# Patient Record
Sex: MALE | Race: WHITE | NOT HISPANIC OR LATINO | Employment: UNEMPLOYED | URBAN - METROPOLITAN AREA
[De-identification: names, ages, dates, MRNs, and addresses within clinical notes are randomized per-mention and may not be internally consistent; named-entity substitution may affect disease eponyms.]

---

## 2023-02-14 LAB
ALT SERPL-CCNC: 32 U/L
AST SERPL-CCNC: 19 U/L (ref 10–40)
CHOLESTEROL (EXTERNAL): 183 MG/DL (ref 0–199)
HBA1C MFR BLD: 6.9 %
HDLC SERPL-MCNC: 36 MG/DL
LDL CHOLESTEROL CALCULATED (EXTERNAL): 95 MG/DL
NON HDL CHOLESTEROL (EXTERNAL): 147 MG/DL
TRIGLYCERIDES (EXTERNAL): 258 MG/DL

## 2024-01-04 LAB — INR (EXTERNAL): 1.1 (ref 0.9–1.2)

## 2024-01-06 LAB
CREATININE (EXTERNAL): 0.77 MG/DL (ref 0.73–1.18)
GFR ESTIMATED (EXTERNAL): >60 ML/MIN/1.73M2
GLUCOSE (EXTERNAL): 168 MG/DL (ref 74–106)
POTASSIUM (EXTERNAL): 4.4 MMOL/L (ref 3.4–5.1)
TSH SERPL-ACNC: 2.87 UIU/ML (ref 0.55–4.78)

## 2024-02-07 ENCOUNTER — TELEPHONE (OUTPATIENT)
Dept: FAMILY MEDICINE | Facility: OTHER | Age: 64
End: 2024-02-07
Payer: COMMERCIAL

## 2024-02-07 NOTE — TELEPHONE ENCOUNTER
Patient is scheduled for 9am appointment tomorrow for skin tag on eyelid removed. I do not offer this service given the sensitive location on the face. Please reach out to the patient as I can offer ENT or dermatology referral and save him an appointment. Let me know if he would be interested in this.    Juan Manuel Villanueva PA-C on 2/7/2024 at 3:15 PM

## 2024-02-08 DIAGNOSIS — L91.8 SKIN TAG: Primary | ICD-10-CM

## 2024-02-08 NOTE — PROGRESS NOTES
Referral placed for ophthalmology. [facility] will call you to coordinate your care as prescribed by your provider. If you don't hear from a representative within 2 business days, please call [phone].       Please let patient know, thanks,  Juan Manuel Villanueva PA-C on 2/8/2024 at 8:41 AM

## 2024-02-29 ENCOUNTER — TRANSFERRED RECORDS (OUTPATIENT)
Dept: MULTI SPECIALTY CLINIC | Facility: CLINIC | Age: 64
End: 2024-02-29
Payer: COMMERCIAL

## 2024-02-29 LAB — EJECTION FRACTION: 25 %

## 2024-03-14 ENCOUNTER — OFFICE VISIT (OUTPATIENT)
Dept: FAMILY MEDICINE | Facility: CLINIC | Age: 64
End: 2024-03-14
Payer: COMMERCIAL

## 2024-03-14 VITALS
WEIGHT: 230.56 LBS | TEMPERATURE: 97.2 F | SYSTOLIC BLOOD PRESSURE: 142 MMHG | RESPIRATION RATE: 18 BRPM | OXYGEN SATURATION: 98 % | HEART RATE: 78 BPM | BODY MASS INDEX: 32.28 KG/M2 | HEIGHT: 71 IN | DIASTOLIC BLOOD PRESSURE: 80 MMHG

## 2024-03-14 DIAGNOSIS — Z00.00 ROUTINE GENERAL MEDICAL EXAMINATION AT A HEALTH CARE FACILITY: Primary | ICD-10-CM

## 2024-03-14 DIAGNOSIS — I48.91 ATRIAL FIBRILLATION WITH RAPID VENTRICULAR RESPONSE (H): ICD-10-CM

## 2024-03-14 DIAGNOSIS — Z11.59 NEED FOR HEPATITIS C SCREENING TEST: ICD-10-CM

## 2024-03-14 DIAGNOSIS — Z12.11 SCREEN FOR COLON CANCER: ICD-10-CM

## 2024-03-14 DIAGNOSIS — I50.21 ACUTE SYSTOLIC HEART FAILURE (H): ICD-10-CM

## 2024-03-14 DIAGNOSIS — E11.9 TYPE 2 DIABETES MELLITUS WITHOUT COMPLICATION, WITHOUT LONG-TERM CURRENT USE OF INSULIN (H): ICD-10-CM

## 2024-03-14 DIAGNOSIS — Z11.4 SCREENING FOR HIV (HUMAN IMMUNODEFICIENCY VIRUS): ICD-10-CM

## 2024-03-14 PROBLEM — N40.0 BPH (BENIGN PROSTATIC HYPERPLASIA): Status: ACTIVE | Noted: 2024-01-05

## 2024-03-14 PROBLEM — I99.8 ACUTE LOWER LIMB ISCHEMIA: Status: ACTIVE | Noted: 2024-01-05

## 2024-03-14 PROBLEM — I10 ESSENTIAL HYPERTENSION: Status: ACTIVE | Noted: 2019-01-11

## 2024-03-14 LAB
ALBUMIN SERPL BCG-MCNC: 4.3 G/DL (ref 3.5–5.2)
ALP SERPL-CCNC: 78 U/L (ref 40–150)
ALT SERPL W P-5'-P-CCNC: 28 U/L (ref 0–70)
ANION GAP SERPL CALCULATED.3IONS-SCNC: 9 MMOL/L (ref 7–15)
AST SERPL W P-5'-P-CCNC: 19 U/L (ref 0–45)
BILIRUB SERPL-MCNC: 0.3 MG/DL
BUN SERPL-MCNC: 22.4 MG/DL (ref 8–23)
CALCIUM SERPL-MCNC: 9.4 MG/DL (ref 8.8–10.2)
CHLORIDE SERPL-SCNC: 105 MMOL/L (ref 98–107)
CHOLEST SERPL-MCNC: 195 MG/DL
CREAT SERPL-MCNC: 1 MG/DL (ref 0.67–1.17)
DEPRECATED HCO3 PLAS-SCNC: 24 MMOL/L (ref 22–29)
EGFRCR SERPLBLD CKD-EPI 2021: 85 ML/MIN/1.73M2
ERYTHROCYTE [DISTWIDTH] IN BLOOD BY AUTOMATED COUNT: 13.2 % (ref 10–15)
FASTING STATUS PATIENT QL REPORTED: ABNORMAL
GLUCOSE SERPL-MCNC: 186 MG/DL (ref 70–99)
HBA1C MFR BLD: 8.2 %
HCT VFR BLD AUTO: 47 % (ref 40–53)
HDLC SERPL-MCNC: 34 MG/DL
HGB BLD-MCNC: 15.2 G/DL (ref 13.3–17.7)
LDLC SERPL CALC-MCNC: ABNORMAL MG/DL
MCH RBC QN AUTO: 29.6 PG (ref 26.5–33)
MCHC RBC AUTO-ENTMCNC: 32.3 G/DL (ref 31.5–36.5)
MCV RBC AUTO: 92 FL (ref 78–100)
NONHDLC SERPL-MCNC: 161 MG/DL
PLATELET # BLD AUTO: 127 10E3/UL (ref 150–450)
POTASSIUM SERPL-SCNC: 4.5 MMOL/L (ref 3.4–5.3)
PROT SERPL-MCNC: 7.7 G/DL (ref 6.4–8.3)
RBC # BLD AUTO: 5.13 10E6/UL (ref 4.4–5.9)
SODIUM SERPL-SCNC: 138 MMOL/L (ref 135–145)
TRIGL SERPL-MCNC: 404 MG/DL
WBC # BLD AUTO: 7.9 10E3/UL (ref 4–11)

## 2024-03-14 PROCEDURE — 83036 HEMOGLOBIN GLYCOSYLATED A1C: CPT | Performed by: FAMILY MEDICINE

## 2024-03-14 PROCEDURE — 99214 OFFICE O/P EST MOD 30 MIN: CPT | Mod: 25 | Performed by: FAMILY MEDICINE

## 2024-03-14 PROCEDURE — 36415 COLL VENOUS BLD VENIPUNCTURE: CPT | Performed by: FAMILY MEDICINE

## 2024-03-14 PROCEDURE — 86706 HEP B SURFACE ANTIBODY: CPT | Performed by: FAMILY MEDICINE

## 2024-03-14 PROCEDURE — 80061 LIPID PANEL: CPT | Performed by: FAMILY MEDICINE

## 2024-03-14 PROCEDURE — 80053 COMPREHEN METABOLIC PANEL: CPT | Performed by: FAMILY MEDICINE

## 2024-03-14 PROCEDURE — 87389 HIV-1 AG W/HIV-1&-2 AB AG IA: CPT | Performed by: FAMILY MEDICINE

## 2024-03-14 PROCEDURE — 83721 ASSAY OF BLOOD LIPOPROTEIN: CPT | Performed by: FAMILY MEDICINE

## 2024-03-14 PROCEDURE — 85027 COMPLETE CBC AUTOMATED: CPT | Performed by: FAMILY MEDICINE

## 2024-03-14 PROCEDURE — 99386 PREV VISIT NEW AGE 40-64: CPT | Performed by: FAMILY MEDICINE

## 2024-03-14 RX ORDER — ATORVASTATIN CALCIUM 20 MG/1
20 TABLET, FILM COATED ORAL DAILY
COMMUNITY
Start: 2023-03-08

## 2024-03-14 RX ORDER — METFORMIN HCL 500 MG
1000 TABLET, EXTENDED RELEASE 24 HR ORAL 2 TIMES DAILY WITH MEALS
COMMUNITY
Start: 2023-03-08

## 2024-03-14 RX ORDER — TAMSULOSIN HYDROCHLORIDE 0.4 MG/1
0.8 CAPSULE ORAL
COMMUNITY
Start: 2023-03-08 | End: 2024-05-24

## 2024-03-14 RX ORDER — LISINOPRIL 5 MG/1
1 TABLET ORAL DAILY
COMMUNITY
Start: 2024-01-07

## 2024-03-14 RX ORDER — METOPROLOL SUCCINATE 50 MG/1
1 TABLET, EXTENDED RELEASE ORAL DAILY
COMMUNITY
Start: 2024-01-07 | End: 2024-04-15 | Stop reason: DRUGHIGH

## 2024-03-14 SDOH — HEALTH STABILITY: PHYSICAL HEALTH: ON AVERAGE, HOW MANY MINUTES DO YOU ENGAGE IN EXERCISE AT THIS LEVEL?: 20 MIN

## 2024-03-14 SDOH — HEALTH STABILITY: PHYSICAL HEALTH: ON AVERAGE, HOW MANY DAYS PER WEEK DO YOU ENGAGE IN MODERATE TO STRENUOUS EXERCISE (LIKE A BRISK WALK)?: 3 DAYS

## 2024-03-14 ASSESSMENT — SOCIAL DETERMINANTS OF HEALTH (SDOH): HOW OFTEN DO YOU GET TOGETHER WITH FRIENDS OR RELATIVES?: ONCE A WEEK

## 2024-03-14 ASSESSMENT — PAIN SCALES - GENERAL: PAINLEVEL: NO PAIN (0)

## 2024-03-14 NOTE — PATIENT INSTRUCTIONS
Preventive Care Advice   This is general advice given by our system to help you stay healthy. However, your care team may have specific advice just for you. Please talk to your care team about your preventive care needs.  Nutrition  Eat 5 or more servings of fruits and vegetables each day.  Try wheat bread, brown rice and whole grain pasta (instead of white bread, rice, and pasta).  Get enough calcium and vitamin D. Check the label on foods and aim for 100% of the RDA (recommended daily allowance).  Lifestyle  Exercise at least 150 minutes each week   (30 minutes a day, 5 days a week).  Do muscle strengthening activities 2 days a week. These help control your weight and prevent disease.  No smoking.  Wear sunscreen to prevent skin cancer.  Have a dental exam and cleaning every 6 months.  Yearly exams  See your health care team every year to talk about:  Any changes in your health.  Any medicines your care team has prescribed.  Preventive care, family planning, and ways to prevent chronic diseases.  Shots (vaccines)   HPV shots (up to age 26), if you've never had them before.  Hepatitis B shots (up to age 59), if you've never had them before.  COVID-19 shot: Get this shot when it's due.  Flu shot: Get a flu shot every year.  Tetanus shot: Get a tetanus shot every 10 years.  Pneumococcal, hepatitis A, and RSV shots: Ask your care team if you need these based on your risk.  Shingles shot (for age 50 and up).  General health tests  Diabetes screening:  Starting at age 35, Get screened for diabetes at least every 3 years.  If you are younger than age 35, ask your care team if you should be screened for diabetes.  Cholesterol test: At age 39, start having a cholesterol test every 5 years, or more often if advised.  Bone density scan (DEXA): At age 50, ask your care team if you should have this scan for osteoporosis (brittle bones).  Hepatitis C: Get tested at least once in your life.  STIs (sexually transmitted  infections)  Before age 24: Ask your care team if you should be screened for STIs.  After age 24: Get screened for STIs if you're at risk. You are at risk for STIs (including HIV) if:  You are sexually active with more than one person.  You don't use condoms every time.  You or a partner was diagnosed with a sexually transmitted infection.  If you are at risk for HIV, ask about PrEP medicine to prevent HIV.  Get tested for HIV at least once in your life, whether you are at risk for HIV or not.  Cancer screening tests  Cervical cancer screening: If you have a cervix, begin getting regular cervical cancer screening tests at age 21. Most people who have regular screenings with normal results can stop after age 65. Talk about this with your provider.  Breast cancer scan (mammogram): If you've ever had breasts, begin having regular mammograms starting at age 40. This is a scan to check for breast cancer.  Colon cancer screening: It is important to start screening for colon cancer at age 45.  Have a colonoscopy test every 10 years (or more often if you're at risk) Or, ask your provider about stool tests like a FIT test every year or Cologuard test every 3 years.  To learn more about your testing options, visit: https://[email or web address]/[email or web address].  For help making a decision, visit: https://bit.ly/ji35012.  Prostate cancer screening test: If you have a prostate and are age 55 to 69, ask your provider if you would benefit from a yearly prostate cancer screening test.  Lung cancer screening: If you are a current or former smoker age 50 to 80, ask your care team if ongoing lung cancer screenings are right for you.  For informational purposes only. Not to replace the advice of your health care provider. Copyright   2023 [employer]. All rights reserved. Clinically reviewed by the [facility] Transitions Program. [employer] [id number] - REV 01/24.    Learning About Stress  What is stress?     Stress is your  body's response to a hard situation. Your body can have a physical, emotional, or mental response. Stress is a fact of life for most people, and it affects everyone differently. What causes stress for you may not be stressful for someone else.  A lot of things can cause stress. You may feel stress when you go on a job interview, take a test, or run a race. This kind of short-term stress is normal and even useful. It can help you if you need to work hard or react quickly. For example, stress can help you finish an important job on time.  Long-term stress is caused by ongoing stressful situations or events. Examples of long-term stress include long-term health problems, ongoing problems at work, or conflicts in your family. Long-term stress can harm your health.  How does stress affect your health?  When you are stressed, your body responds as though you are in danger. It makes hormones that speed up your heart, make you breathe faster, and give you a burst of energy. This is called the fight-or-flight stress response. If the stress is over quickly, your body goes back to normal and no harm is done.  But if stress happens too often or lasts too long, it can have bad effects. Long-term stress can make you more likely to get sick, and it can make symptoms of some diseases worse. If you tense up when you are stressed, you may develop neck, shoulder, or low back pain. Stress is linked to high blood pressure and heart disease.  Stress also harms your emotional health. It can make you gomez, tense, or depressed. Your relationships may suffer, and you may not do well at work or school.  What can you do to manage stress?  You can try these things to help manage stress:   Do something active. Exercise or activity can help reduce stress. Walking is a great way to get started. Even everyday activities such as housecleaning or yard work can help.  Try yoga or kt chi. These techniques combine exercise and meditation. You may need  some training at first to learn them.  Do something you enjoy. For example, listen to music or go to a movie. Practice your hobby or do volunteer work.  Meditate. This can help you relax, because you are not worrying about what happened before or what may happen in the future.  Do guided imagery. Imagine yourself in any setting that helps you feel calm. You can use online videos, books, or a teacher to guide you.  Do breathing exercises. For example:  From a standing position, bend forward from the waist with your knees slightly bent. Let your arms dangle close to the floor.  Breathe in slowly and deeply as you return to a standing position. Roll up slowly and lift your head last.  Hold your breath for just a few seconds in the standing position.  Breathe out slowly and bend forward from the waist.  Let your feelings out. Talk, laugh, cry, and express anger when you need to. Talking with supportive friends or family, a counselor, or a areli leader about your feelings is a healthy way to relieve stress. Avoid discussing your feelings with people who make you feel worse.  Write. It may help to write about things that are bothering you. This helps you find out how much stress you feel and what is causing it. When you know this, you can find better ways to cope.  What can you do to prevent stress?  You might try some of these things to help prevent stress:  Manage your time. This helps you find time to do the things you want and need to do.  Get enough sleep. Your body recovers from the stresses of the day while you are sleeping.  Get support. Your family, friends, and community can make a difference in how you experience stress.  Limit your news feed. Avoid or limit time on social media or news that may make you feel stressed.  Do something active. Exercise or activity can help reduce stress. Walking is a great way to get started.  Where can you learn more?  Go to https://www.healthwise.net/patiented  Enter N032 in the  "search box to learn more about \"Learning About Stress.\"  Current as of: October 24, 2023               Content Version: 14.0    7506-5744 [employer].   Care instructions adapted under license by your healthcare professional. If you have questions about a medical condition or this instruction, always ask your healthcare professional. [employer] disclaims any warranty or liability for your use of this information.      Substance Use Disorder: Care Instructions  Overview     You can improve your life and health by stopping your use of alcohol or drugs. When you don't drink or use drugs, you may feel and sleep better. You may get along better with your family, friends, and coworkers. There are medicines and programs that can help with substance use disorder.  How can you care for yourself at home?  Here are some ways to help you stay sober and prevent relapse.  If you have been given medicine to help keep you sober or reduce your cravings, be sure to take it exactly as prescribed.  Talk to your doctor about programs that can help you stop using drugs or drinking alcohol.  Do not keep alcohol or drugs in your home.  Plan ahead. Think about what you'll say if other people ask you to drink or use drugs. Try not to spend time with people who drink or use drugs.  Use the time and money spent on drinking or drugs to do something that's important to you.  Preventing a relapse  Have a plan to deal with relapse. Learn to recognize changes in your thinking that lead you to drink or use drugs. Get help before you start to drink or use drugs again.  Try to stay away from situations, friends, or places that may lead you to drink or use drugs.  If you feel the need to drink alcohol or use drugs again, seek help right away. Call a trusted friend or family member. Some people get support from organizations such as Narcotics Anonymous or [employer] or from treatment facilities.  If you relapse, get help " as soon as you can. Some people make a plan with another person that outlines what they want that person to do for them if they relapse. The plan usually includes how to handle the relapse and who to notify in case of relapse.  Don't give up. Remember that a relapse doesn't mean that you have failed. Use the experience to learn the triggers that lead you to drink or use drugs. Then quit again. Recovery is a lifelong process. Many people have several relapses before they are able to quit for good.  Follow-up care is a key part of your treatment and safety. Be sure to make and go to all appointments, and call your doctor if you are having problems. It's also a good idea to know your test results and keep a list of the medicines you take.  When should you call for help?   Call 911  anytime you think you may need emergency care. For example, call if you or someone else:    Has overdosed or has withdrawal signs. Be sure to tell the emergency workers that you are or someone else is using or trying to quit using drugs. Overdose or withdrawal signs may include:  Losing consciousness.  Seizure.  Seeing or hearing things that aren't there (hallucinations).     Is thinking or talking about suicide or harming others.   Where to get help 24 hours a day, 7 days a week   If you or someone you know talks about suicide, self-harm, a mental health crisis, a substance use crisis, or any other kind of emotional distress, get help right away. You can:    Call the Suicide and Crisis Lifeline at 983.     Call 9-891-857-TALK (1-242.710.4136).     Text HOME to 492319 to access the Crisis Text Line.   Consider saving these numbers in your phone.  Go to [email or web address] for more information or to chat online.  Call your doctor now or seek immediate medical care if:    You are having withdrawal symptoms. These may include nausea or vomiting, sweating, shakiness, and anxiety.   Watch closely for changes in your health, and be sure to contact  "your doctor if:    You have a relapse.     You need more help or support to stop.   Where can you learn more?  Go to https://www.[employer].net/patiented  Enter H573 in the search box to learn more about \"Substance Use Disorder: Care Instructions.\"  Current as of: November 15, 2023               Content Version: 14.0    7478-8389 [employer].   Care instructions adapted under license by your healthcare professional. If you have questions about a medical condition or this instruction, always ask your healthcare professional. [employer] disclaims any warranty or liability for your use of this information.      "

## 2024-03-14 NOTE — LETTER
March 18, 2024      Carmita Horn  [id number] [facility] [address]      Dear ,    We are writing to inform you of your test results.    Your test results fall within the expected range(s) or remain unchanged from previous results.  We will review in detail at your follow up visit with me on 4/15/24. Please continue with your current treatment plan.   Resulted Orders   Hemoglobin A1c   Result Value Ref Range    Hemoglobin A1C 8.2 (H) <5.7 %      Comment:      Normal <5.7%   Prediabetes 5.7-6.4%    Diabetes 6.5% or higher     Note: Adopted from ADA consensus guidelines.   Comprehensive metabolic panel (BMP + Alb, Alk Phos, ALT, AST, Total. Bili, TP)   Result Value Ref Range    Sodium 138 135 - 145 mmol/L      Comment:      Reference intervals for this test were updated on 09/26/2023 to more accurately reflect our healthy population. There may be differences in the flagging of prior results with similar values performed with this method. Interpretation of those prior results can be made in the context of the updated reference intervals.     Potassium 4.5 3.4 - 5.3 mmol/L    Carbon Dioxide (CO2) 24 22 - 29 mmol/L    Anion Gap 9 7 - 15 mmol/L    Urea Nitrogen 22.4 8.0 - 23.0 mg/dL    Creatinine 1.00 0.67 - 1.17 mg/dL    GFR Estimate 85 >60 mL/min/1.73m2    Calcium 9.4 8.8 - 10.2 mg/dL    Chloride 105 98 - 107 mmol/L    Glucose 186 (H) 70 - 99 mg/dL    Alkaline Phosphatase 78 40 - 150 U/L      Comment:      Reference intervals for this test were updated on 11/14/2023 to more accurately reflect our healthy population. There may be differences in the flagging of prior results with similar values performed with this method. Interpretation of those prior results can be made in the context of the updated reference intervals.    AST 19 0 - 45 U/L      Comment:      Reference intervals for this test were updated on 6/12/2023 to more accurately reflect our healthy population. There may be differences in the  flagging of prior results with similar values performed with this method. Interpretation of those prior results can be made in the context of the updated reference intervals.    ALT 28 0 - 70 U/L      Comment:      Reference intervals for this test were updated on 6/12/2023 to more accurately reflect our healthy population. There may be differences in the flagging of prior results with similar values performed with this method. Interpretation of those prior results can be made in the context of the updated reference intervals.      Protein Total 7.7 6.4 - 8.3 g/dL    Albumin 4.3 3.5 - 5.2 g/dL    Bilirubin Total 0.3 <=1.2 mg/dL   CBC with platelets   Result Value Ref Range    WBC Count 7.9 4.0 - 11.0 10e3/uL    RBC Count 5.13 4.40 - 5.90 10e6/uL    Hemoglobin 15.2 13.3 - 17.7 g/dL    Hematocrit 47.0 40.0 - 53.0 %    MCV 92 78 - 100 fL    MCH 29.6 26.5 - 33.0 pg    MCHC 32.3 31.5 - 36.5 g/dL    RDW 13.2 10.0 - 15.0 %    Platelet Count 127 (L) 150 - 450 10e3/uL   Lipid panel reflex to direct LDL Fasting   Result Value Ref Range    Cholesterol 195 <200 mg/dL    Triglycerides 404 (H) <150 mg/dL    Direct Measure HDL 34 (L) >=40 mg/dL    LDL Cholesterol Calculated        Comment:      Cannot estimate LDL when triglyceride exceeds 400 mg/dL    Non HDL Cholesterol 161 (H) <130 mg/dL    Patient Fasting > 8hrs? Unknown       Comment:      LAST ATE 1PM OR NOON  LAST ATE 1PM OR NOON  LAST ATE 1PM OR NOON  LAST ATE 1PM OR NOON    Narrative    Cholesterol  Desirable:  <200 mg/dL    Triglycerides  Normal:  Less than 150 mg/dL  Borderline High:  150-199 mg/dL  High:  200-499 mg/dL  Very High:  Greater than or equal to 500 mg/dL    Direct Measure HDL  Female:  Greater than or equal to 50 mg/dL   Male:  Greater than or equal to 40 mg/dL    LDL Cholesterol  Desirable:  <100mg/dL  Above Desirable:  100-129 mg/dL   Borderline High:  130-159 mg/dL   High:  160-189 mg/dL   Very High:  >= 190 mg/dL    Non HDL Cholesterol  Desirable:  130  mg/dL  Above Desirable:  130-159 mg/dL  Borderline High:  160-189 mg/dL  High:  190-219 mg/dL  Very High:  Greater than or equal to 220 mg/dL   HIV Antigen Antibody Combo   Result Value Ref Range    HIV Antigen Antibody Combo Nonreactive Nonreactive      Comment:      Negative HIV-1/-2 antigen and antibody screening test results usually indicate the absence of HIV-1 and HIV-2 infection. However, such negative results do not rule-out acute HIV infection.  If acute HIV-1 or HIV-2 infection is suspected, detection of HIV-1 or HIV-2 RNA  is recommended.    Hepatitis B Surface Antibody   Result Value Ref Range    Hepatitis B Surface Antibody Nonreactive       Comment:      Nonreactive results, defined as anti-HBs levels of less than 8.5 mIU/mL, indicate a lack of recovery from acute or chronic hepatitis B or inadequate immune response to HBV vaccination.    Hepatitis B Surface Antibody Instrument Value <3.50 <8.5 m[IU]/mL   LDL cholesterol direct   Result Value Ref Range    LDL Cholesterol Direct 118 (H) <100 mg/dL      Comment:      Age 2-19 years:  Desirable: 0-110 mg/dL   Borderline high: 110-129 mg/dL   High: >= 130 mg/dL    Age 20 years and older:  Desirable: <100mg/dL  Above desirable: 100-129 mg/dL   Borderline high: 130-159 mg/dL   High: 160-189 mg/dL   Very high: >= 190 mg/dL   If you have any questions or concerns, please call the clinic at the number listed above.       Sincerely,      Colt Suarez MD

## 2024-03-14 NOTE — PROGRESS NOTES
Preventive Care Visit  [facility]  Colt Suarez MD, Family Medicine  Mar 14, 2024      Assessment & Plan   1. Routine general medical examination at a health care facility  [place] acute systolic heart failure with reduced ejection fraction due to new atrial fibrillation with rapid ventricular response.  Is a 63-year-old male who presents to clinic today as a new patient and in follow-up of hospitalization for new onset atrial fibrillation with rapid ventricular response and thromboembolic event to the left lower extremity leading to ischemia.  He has previously been receiving his health care through the [facility] down in [place].  Unfortunately he is insurance changed after the beginning of the year just before he developed acute onset of left ischemia leg symptoms.  He was seen at [facility] and admitted on January 4 and discharged on January 6.  We reviewed all of his past medical history and specifically his medications.  He has a history of hypertension and has been on amlodipine and lisinopril in the past.  He has a history of type 2 diabetes and has been on metformin in the past.  Per admission documentation he had been off of some of his medications prior to the onset of his leg ischemia.  The workup in the emergency department identified new onset atrial fibrillation with rapid ventricular response with associated acute systolic heart failure with an decreased ejection fraction of 30%.  He underwent thrombectomy for the acute limb ischemia and further cardiac evaluation including echocardiography.  He subsequently recently underwent a nuclear stress test and is followed by cardiology at [facility].  He has follow-up appointment with cardiology next week to review that nuclear stress test.    He has quite a bit of confusion about the medications that he is on.  Unfortunately he did not bring his pills in today.  Reviewing his past medical  history it appears that he is currently on lisinopril 5 mg once daily.  He states that this makes him feel dizzy however he has previously was on lisinopril 40 mg daily.  He is also on metoprolol 50 mg once daily for blood pressure and rate control.  He is currently on Eliquis 5 mg twice daily for the atrial fibrillation anticoagulation.  He is also currently on Lipitor 20 mg daily but he does not know whether he is taking this or not.  As a diabetic he should be taking this.  He is also on metformin 1000 mg daily and unfortunately his last hemoglobin A1c was over a year ago.  He also takes Flomax 0.8 mg at bedtime for benign prostatic hypertrophy.  His last PSA was a year ago as well.    We reviewed his past surgical history, the age and gender specific recommendations for screening including colonoscopy.  He has a strong family history for colon cancer and he himself has undergone previous colonoscopies with multiple polyps removed.  His last colonoscopy was in 2022 and he had multiple adenomatous polyps removed.  Recommendation was for a 1 year follow-up which she has not had.  We also reviewed vaccination opportunities specifically around COVID, influenza, tetanus, hepatitis B, and shingles.  He declines all vaccination at this time but is open to the idea of getting tetanus vaccine and possibly pneumonia vaccine.  We reviewed his past diabetic eye exams and he is past due for these.    In general he is quite confused about his current medications past medical history and the timing of screenings.  I think a lot of the confusion is around the change in his insurance coverage in the last year.    Today we clarified his medications and made recommendations for him to continue on his discharge medications as ordered.  Will also obtain a hemoglobin A1c and repeat his renal profile after his nuclear stress test.  He does have a follow-up appointment with cardiology next week to review his stress test.  I have asked  that he schedule follow-up with me in 4 to 6 weeks bringing in all of his medications to review what he has available and clarify the dosing's.  - HIV Antigen Antibody Combo; Future  - Hepatitis B Surface Antibody; Future    2. Screen for colon cancer  High risk with maternal history of colon cancer.  He himself is undergone multiple colonoscopies with multiple polyps removed at his last colonoscopy in 2022.  The recommendation was for annual follow-up.  He has not had his recommended follow-up last year.  Will schedule him with GI for colonoscopy.  He was previously having this done by [facility] in the [facility] but this can be done here in [place].  - Adult GI  Referral - Procedure Only; Future    3. Screening for HIV (human immunodeficiency virus)  Low risk and agrees to screening.    4. Need for hepatitis C screening test  Low risk and agrees to screening.    5. Atrial fibrillation with rapid ventricular response (H)  New onset atrial fibrillation with rapid ventricular response resulting in thromboembolic event to the lower extremity.  Echocardiogram demonstrated reduced ejection fraction at 30 to 35%.  He is currently anticoagulated with Eliquis 5 mg twice daily.  His rate is currently managed with metoprolol XL 50 mg daily.  He was started on lisinopril 5 mg for cardioprotection.  Continue these medications and follow-up with cardiology as scheduled next week.  He remains in atrial fibrillation today with a controlled rate of 80.  Blood pressure is slightly elevated because he has been holding his lisinopril because he thought this made him feel dizzy.  Recommend he continue with this and discussed with cardiology.  Chronic,  - apixaban ANTICOAGULANT (ELIQUIS) 5 MG tablet; Take 1 tablet (5 mg) by mouth 2 times daily  Dispense: 180 tablet; Refill: 1    6. Type 2 diabetes mellitus without complication, without long-term current use of insulin (H)  Chronic, uncontrolled.  He had not had an A1c in over a  "year.  His A1c today is 8.2.  He is currently on metformin 1000 mg daily.  Will increase his metformin to 1000 mg twice daily and because of his heart failure would consider adding Jardiance.  - Hemoglobin A1c; Future  - Comprehensive metabolic panel (BMP + Alb, Alk Phos, ALT, AST, Total. Bili, TP); Future  - CBC with platelets; Future  - Lipid panel reflex to direct LDL Fasting; Future    7. Acute systolic heart failure (H)  Acute systolic heart failure with reduced ejection fraction secondary to new onset atrial fibrillation with rapid ventricular response.  Patient is currently on lisinopril 5 mg once daily and metoprolol 50 mg daily.  He is anticoagulated with Eliquis 5 mg twice daily.  Echocardiogram demonstrated an ejection fraction of 30 to 35%.  He underwent nuclear stress test last week which showed no inducible ischemia but confirmed ejection fraction of 25%.  He is followed by cardiology and has follow-up appointment with cardiology next week to review.  Continue current medications and plan.  He will follow-up with me in 1 month to review the recommendations from cardiology and medications as well.    Patient has been advised of split billing requirements and indicates understanding: Yes  Review of prior external note(s) from - [facility] information from [facility]  and [facility] reviewed  Ordering of each unique test  Prescription drug management        Nicotine/Tobacco Cessation  He reports that he has been smoking cigarettes. He has been exposed to tobacco smoke. He has never used smokeless tobacco.  Nicotine/Tobacco Cessation Plan  Information offered: Patient not interested at this time      BMI  Estimated body mass index is 32.1 kg/m  as calculated from the following:    Height as of this encounter: 1.805 m (5' 11.06\").    Weight as of this encounter: 104.6 kg (230 lb 9 oz).   Weight management plan: Discussed healthy diet and exercise guidelines    Counseling  Appropriate preventive " services were discussed with this patient, including applicable screening as appropriate for fall prevention, nutrition, physical activity, Tobacco-use cessation, weight loss and cognition.  Checklist reviewing preventive services available has been given to the patient.  Reviewed patient's diet, addressing concerns and/or questions.   He is at risk for lack of exercise and has been provided with information to increase physical activity for the benefit of his well-being.   The patient was instructed to see the dentist every 6 months.   He is at risk for psychosocial distress and has been provided with information to reduce risk.       FUTURE APPOINTMENTS:       - Follow up with cardiology next week as scheduled  SELF MONITORING:       - Please check blood glucose readings daily       - Please check blood pressure readings daily       - Please monitor pulse  Work on weight loss  Regular exercise   Follow-up Visit   Expected date:  Apr 14, 2024 (Approximate)      Follow Up Appointment Details:     Follow-up with whom?: Me    Follow-Up for what?: Chronic Disease f/u    Chronic Disease f/u:  Diabetes  Heart Failure  Hyperlipidemia  Hypertension       How?: In Person             Follow-up Visit   Expected date:  Mar 14, 2025 (Approximate)      Follow Up Appointment Details:     Follow-up with whom?: PCP    Follow-Up for what?: Adult Preventive    How?: In Person                       Jelena Vizcarra is a 63 year old, presenting for the following:  Physical        3/14/2024     4:55 PM   Additional Questions   Roomed by Dania        Via the Health Maintenance questionnaire, the patient has reported the following services have been completed -Colonscopy, this information has been sent to the abstraction team.  Health Care Directive  Patient does not have a Health Care Directive or Living Will: Discussed advance care planning with patient; however, patient declined at this time.    HPI      Diabetes  "Follow-up    How often are you checking your blood sugar? Not at all  What concerns do you have today about your diabetes? None and Other: Questions about medication compliance.   Do you have any of these symptoms? (Select all that apply)  No numbness or tingling in feet.  No redness, sores or blisters on feet.  No complaints of excessive thirst.  No reports of blurry vision.  No significant changes to weight.          Hyperlipidemia Follow-Up    Are you regularly taking any medication or supplement to lower your cholesterol?   Yes- Lipitor  Are you having muscle aches or other side effects that you think could be caused by your cholesterol lowering medication?  No    Hypertension Follow-up    Do you check your blood pressure regularly outside of the clinic?  No  Are you following a low salt diet? No  Are your blood pressures ever more than 140 on the top number (systolic) OR more   than 90 on the bottom number (diastolic), for example 140/90? No    BP Readings from Last 2 Encounters:   03/14/24 (!) 142/80     No results found for: \"A1C\", \"LDL\"      Heart Failure Follow-up   Are you experiencing any shortness of breath? Yes, with activity only     How would you describe your shortness of breath?  Improved  Are you experiencing any swelling in your legs or feet?  No  Are you using more pillows than usual? No  Do you cough at night?  Yes  Do you check your weight daily?  No  Have you had a weight change recently?  No  Are you having any of the following side effects from your medications? (Select all that apply)  Dizziness  Since your last visit, how many times have you gone to the cardiologist, urgent care, emergency room, or hospital because of your heart failure?     Last Echo: No results found.        3/14/2024   General Health   How would you rate your overall physical health? (!) FAIR   Feel stress (tense, anxious, or unable to sleep) Only a little   (!) STRESS CONCERN      3/14/2024   Nutrition   Three or more " "servings of calcium each day? Yes   Diet: Diabetic   How many servings of fruit and vegetables per day? (!) 0-1   How many sweetened beverages each day? (!) 2         3/14/2024   Exercise   Days per week of moderate/strenous exercise 3 days   Average minutes spent exercising at this level 20 min         3/14/2024   Social Factors   Frequency of gathering with friends or relatives Once a week   Worry food won't last until get money to buy more No   Food not last or not have enough money for food? No   Do you have housing?  Yes   Are you worried about losing your housing? No   Lack of transportation? No   Unable to get utilities (heat,electricity)? No          No data to display                   3/14/2024   Dental   Dentist two times every year? (!) NO         3/14/2024   TB Screening   Were you born outside of [place]?  No         Today's PHQ-2 Score:       3/14/2024     5:00 PM   PHQ-2 ( 1999 [employer])   Q1: Little interest or pleasure in doing things 0   Q2: Feeling down, depressed or hopeless 0   PHQ-2 Score 0           3/14/2024   Substance Use   Alcohol more than 3/day or more than 7/wk No   Do you use any other substances recreationally? (!) ALCOHOL     Social History     Tobacco Use    Smoking status: Some Days     Types: Cigarettes     Passive exposure: Past    Smokeless tobacco: Never   Vaping Use    Vaping Use: Some days             3/14/2024   One time HIV Screening   Previous HIV test? No         3/14/2024   STI Screening   New sexual partner(s) since last STI/HIV test? No   Last PSA: No results found for: \"PSA\"  ASCVD Risk   The ASCVD Risk score (Emerald IRBY, et al., 2019) failed to calculate for the following reasons:    Cannot find a previous HDL lab    Cannot find a previous total cholesterol lab           Reviewed and updated as needed this visit by Provider                    Lab work is in process  Labs reviewed in EPIC  BP Readings from Last 3 Encounters:   03/14/24 (!) 142/80    Wt Readings from " "Last 3 Encounters:   03/14/24 104.6 kg (230 lb 9 oz)                  Patient Active Problem List   Diagnosis    Acute lower limb ischemia    Atrial fibrillation with rapid ventricular response (H)    BPH (benign prostatic hyperplasia)    Essential hypertension    Type 2 diabetes mellitus without complication, without long-term current use of insulin (H)    Pain in joint, shoulder region     History reviewed. No pertinent surgical history.    Social History     Tobacco Use    Smoking status: Some Days     Types: Cigarettes     Passive exposure: Past    Smokeless tobacco: Never   Substance Use Topics    Alcohol use: Not on file     History reviewed. No pertinent family history.      Current Outpatient Medications   Medication Sig Dispense Refill    apixaban ANTICOAGULANT (ELIQUIS) 5 MG tablet Take 1 tablet (5 mg) by mouth 2 times daily 180 tablet 1    lisinopril (ZESTRIL) 5 MG tablet Take 1 tablet by mouth daily      metFORMIN (GLUCOPHAGE XR) 500 MG 24 hr tablet Take 1,000 mg by mouth      metoprolol succinate ER (TOPROL XL) 50 MG 24 hr tablet Take 1 tablet by mouth daily      tamsulosin (FLOMAX) 0.4 MG capsule Take 0.8 mg by mouth       No Known Allergies  No lab results found.       Review of Systems  CONSTITUTIONAL: NEGATIVE for fever, chills, change in weight  ENT/MOUTH: NEGATIVE for ear, mouth and throat problems  RESP: NEGATIVE for significant cough or SOB  CV: POSITIVE for HX HTN, irregular heart beat, palpitations, and lightheadness and NEGATIVE for chest pain/chest pressure, lower extremity edema, and paroxysmal nocturnal dyspnea  ROS otherwise negative     Objective    Exam  BP (!) 142/80 (BP Location: Right arm, Patient Position: Sitting, Cuff Size: Adult Large)   Pulse 78   Temp 97.2  F (36.2  C) (Temporal)   Resp 18   Ht 1.805 m (5' 11.06\")   Wt 104.6 kg (230 lb 9 oz)   SpO2 98%   BMI 32.10 kg/m     Estimated body mass index is 32.1 kg/m  as calculated from the following:    Height as of this " "encounter: 1.805 m (5' 11.06\").    Weight as of this encounter: 104.6 kg (230 lb 9 oz).    Physical Exam  GENERAL: alert, no distress, and obese  EYES: Eyes grossly normal to inspection and eyelids- skin tag on left upper lid  HENT: ear canals and TM's normal, nose and mouth without ulcers or lesions  NECK: no adenopathy, no asymmetry, masses, or scars  RESP: lungs clear to auscultation - no rales, rhonchi or wheezes  CV: irregularly irregular rhythm, normal S1 S2, no S3 or S4, no murmur, click or rub, peripheral pulses strong, and no peripheral edema  ABDOMEN: soft, nontender, no hepatosplenomegaly, no masses and bowel sounds normal  MS: no gross musculoskeletal defects noted, no edema  NEURO: Normal strength and tone, mentation intact and speech normal  LYMPH: no cervical, supraclavicular, axillary, or inguinal adenopathy        Signed Electronically by: Colt Suarez MD    "

## 2024-03-15 LAB
HBV SURFACE AB SERPL IA-ACNC: <3.5 M[IU]/ML
HBV SURFACE AB SERPL IA-ACNC: NONREACTIVE M[IU]/ML
HIV 1+2 AB+HIV1 P24 AG SERPL QL IA: NONREACTIVE
LDLC SERPL DIRECT ASSAY-MCNC: 118 MG/DL

## 2024-03-19 ENCOUNTER — TELEPHONE (OUTPATIENT)
Dept: GASTROENTEROLOGY | Facility: CLINIC | Age: 64
End: 2024-03-19
Payer: COMMERCIAL

## 2024-03-19 ENCOUNTER — TELEPHONE (OUTPATIENT)
Dept: FAMILY MEDICINE | Facility: OTHER | Age: 64
End: 2024-03-19
Payer: COMMERCIAL

## 2024-03-19 NOTE — TELEPHONE ENCOUNTER
"Endoscopy Scheduling Screen    Have you had a positive Covid test in the last 14 days?  No    What is your communication preference for Instructions and/or Bowel Prep?   Mail/USPS    What insurance is in the chart?  Other:  [facility]     Ordering/Referring Provider:     ORA FRIAS       (If ordering provider performs procedure, schedule with ordering provider unless otherwise instructed. )    BMI: Estimated body mass index is 32.1 kg/m  as calculated from the following:    Height as of 3/14/24: 1.805 m (5' 11.06\").    Weight as of 3/14/24: 104.6 kg (230 lb 9 oz).     Sedation Ordered  moderate sedation.   If patient BMI > 50 do not schedule in [facility].    If patient BMI > 45 do not schedule at [facility].    Are you taking methadone or Suboxone?  No    Have you had difficulties, pain, or discomfort during past endoscopy procedures?  No    Are you taking any prescription medications for pain 3 or more times per week?   NO, No RN review required.    Do you have a history of malignant hyperthermia?  No    (Females) Are you currently pregnant?   No     Have you been diagnosed or told you have pulmonary hypertension?   No    Do you have an LVAD?  No    Have you been told you have moderate to severe sleep apnea?  No    Have you been told you have COPD, asthma, or any other lung disease?  No    Do you have any heart conditions?  No     Have you ever had or are you waiting for an organ transplant?  No. Continue scheduling, no site restrictions.    Have you had a stroke or transient ischemic attack (TIA aka \"mini stroke\" in the last 6 months?   No    Have you been diagnosed with or been told you have cirrhosis of the liver?   No    Are you currently on dialysis?   No    Do you need assistance transferring?   No    BMI: Estimated body mass index is 32.1 kg/m  as calculated from the following:    Height as of 3/14/24: 1.805 m (5' 11.06\").    Weight as of 3/14/24: 104.6 kg (230 lb 9 oz).     Is patients BMI > 40 and scheduling location " [facility]?  No    Do you take an injectable medication for weight loss or diabetes (excluding insulin)?  No    Do you take the medication Naltrexone?  No    Do you take blood thinners?  Yes  - eliquist     Are you taking Effient/Prasugrel?  No, you must contact your prescribing provider for direction on holding or bridging with a different medication.       Prep   Are you currently on dialysis or do you have chronic kidney disease?  No    Do you have a diagnosis of diabetes?  Yes (Golytely Prep)    Do you have a diagnosis of cystic fibrosis (CF)?  No    On a regular basis do you go 3 -5 days between bowel movements?  No    BMI > 40?  No    Preferred Pharmacy:    [facility] [address] [facility] [address] [place]  [address] [facility] [address]  Phone: [phone] Fax: [phone]      Final Scheduling Details     Procedure scheduled  Colonoscopy    Surgeon:  Shonna      Date of procedure:  04/01/2024     Pre-OP / PAC:   No - Not required for this site.    Location  [facility] - Per order.    Sedation   MAC/Deep Sedation  per loc       Patient Reminders:   You will receive a call from a Nurse to review instructions and health history.  This assessment must be completed prior to your procedure.  Failure to complete the Nurse assessment may result in the procedure being cancelled.      On the day of your procedure, please designate an adult(s) who can drive you home stay with you for the next 24 hours. The medicines used in the exam will make you sleepy. You will not be able to drive.      You cannot take public transportation, ride share services, or non-medical taxi service without a responsible caregiver.  Medical transport services are allowed with the requirement that a responsible caregiver will receive you at your destination.  We require that drivers and caregivers are confirmed prior to your procedure.

## 2024-03-19 NOTE — TELEPHONE ENCOUNTER
Patient is on Eliquis and has a colonoscopy on April 1st/    Needs instructions for holding medication/    Toya Ferrer XRO/

## 2024-03-19 NOTE — TELEPHONE ENCOUNTER
General Call      Reason for Call:  Patient stopped in and wants to know when he should stop his blood thinner medication prior to his surgery on April 1st.    What are your questions or concerns:  See above    Date of last appointment with provider: 03/14/2024    Okay to leave a detailed message?: Yes at Home number on file [phone]

## 2024-03-21 NOTE — TELEPHONE ENCOUNTER
Please have patient HOLD Eliquis 1 day prior to and the morning of the colonoscopy.  Colt Suarez MD

## 2024-03-21 NOTE — TELEPHONE ENCOUNTER
Spoke with patient and informed of note. Patient understood.     Closing encounter.   Alexa Rai MA

## 2024-03-29 NOTE — H&P
[facility] History and Physical    Carmita Horn MRN# [id number]   Age: 63 year old YOB: 1960     Date of Admission:  (Not on file)    Home clinic: [facility]  Primary care provider: Juan Manuel Villanueva          Impression and Plan:   Impression:   Screen for colon cancer [Z12.11]  Last colonoscopy 2022-results not viewable - reports poyps      Plan:   Proceed to EGD and Colonoscopy as planned.  The procedure, risks(bleeding, perforation), benefits and alternatives were discussed and the patient agrees to proceed. Cleared for Anesthesia             Chief Complaint:   Screen for colon cancer [Z12.11]    History is obtained from the patient          History of Present Illness:   This 63 year old male is being seen at this time for evaluation for colonoscopy.  22 polyps in 2022.  Mother with colon ca age 67.           Past Medical History:   No past medical history on file.         Past Surgical History:   No past surgical history on file.         Social History:     Social History     Tobacco Use    Smoking status: Some Days     Types: Cigarettes     Passive exposure: Past    Smokeless tobacco: Never   Substance Use Topics    Alcohol use: Not on file            Family History:   No family history on file.         Immunizations:     VACCINE/DOSE   Diptheria   DPT   DTAP   HBIG   Hepatitis A   Hepatitis B   HIB   Influenza   Measles   Meningococcal   MMR   Mumps   Pneumococcal   Polio   Rubella   Small Pox   TDAP   Varicella   Zoster            Allergies:     Allergies   Allergen Reactions    Penicillins Palpitations and Shortness Of Breath            Medications:     No current facility-administered medications for this encounter.     Current Outpatient Medications   Medication Sig    apixaban ANTICOAGULANT (ELIQUIS) 5 MG tablet Take 1 tablet (5 mg) by mouth 2 times daily    atorvastatin (LIPITOR) 20 MG tablet Take 20 mg by mouth daily    metFORMIN (GLUCOPHAGE XR) 500 MG  24 hr tablet Take 1,000 mg by mouth    metoprolol succinate ER (TOPROL XL) 50 MG 24 hr tablet Take 1 tablet by mouth daily    bisacodyl (DULCOLAX) 5 MG EC tablet Take 2 tablets at 3 pm the day before your procedure. If your procedure is before 11 am, take 2 additional tablets at 11 pm. If your procedure is after 11 am, take 2 additional tablets at 6 am. For additional instructions refer to your colonoscopy prep instructions.    lisinopril (ZESTRIL) 5 MG tablet Take 1 tablet by mouth daily    polyethylene glycol (GOLYTELY) 236 g suspension The night before the exam at 6 pm drink an 8-ounce glass every 15 minutes until the jug is half empty. If you arrive before 11 AM: Drink the other half of the Golytely jug at 11 PM night before procedure. If you arrive after 11 AM: Drink the other half of the Golytely jug at 6 AM day of procedure. For additional instructions refer to your colonoscopy prep instructions.    tamsulosin (FLOMAX) 0.4 MG capsule Take 0.8 mg by mouth             Review of Systems:   The review of systems was positive for the following findings.  None.  The remainder of the review of systems was unremarkable.          Physical Exam:   All vitals have been reviewed  There were no vitals taken for this visit.  No intake or output data in the 24 hours ending 03/29/24 1347  SHEENT examination revealed NC/aT, EOMI.  Examination of the chest revealed CTA.  Examination of the heart revealed RRR.  Examination of the abdomen revealed Soft, non tender.  The neuromuscular examination was NL.          Data:   All laboratory data reviewed  No results found for any visits on 04/01/24.  -     Juan José Kilpatrick MD, FACS

## 2024-04-01 ENCOUNTER — ANESTHESIA EVENT (OUTPATIENT)
Dept: GASTROENTEROLOGY | Facility: CLINIC | Age: 64
End: 2024-04-01
Payer: COMMERCIAL

## 2024-04-01 ENCOUNTER — HOSPITAL ENCOUNTER (OUTPATIENT)
Facility: CLINIC | Age: 64
Discharge: HOME | End: 2024-04-01
Attending: SPECIALIST | Admitting: SPECIALIST
Payer: COMMERCIAL

## 2024-04-01 ENCOUNTER — ANESTHESIA (OUTPATIENT)
Dept: GASTROENTEROLOGY | Facility: CLINIC | Age: 64
End: 2024-04-01
Payer: COMMERCIAL

## 2024-04-01 VITALS
DIASTOLIC BLOOD PRESSURE: 96 MMHG | WEIGHT: 230 LBS | HEIGHT: 71 IN | OXYGEN SATURATION: 97 % | SYSTOLIC BLOOD PRESSURE: 135 MMHG | TEMPERATURE: 97.7 F | BODY MASS INDEX: 32.2 KG/M2 | RESPIRATION RATE: 18 BRPM | HEART RATE: 101 BPM

## 2024-04-01 LAB
COLONOSCOPY: NORMAL
GLUCOSE BLDC GLUCOMTR-MCNC: 169 MG/DL (ref 70–99)

## 2024-04-01 PROCEDURE — 258N000003 HC RX IP 258 OP 636: Performed by: NURSE ANESTHETIST, CERTIFIED REGISTERED

## 2024-04-01 PROCEDURE — 250N000009 HC RX 250: Performed by: NURSE ANESTHETIST, CERTIFIED REGISTERED

## 2024-04-01 PROCEDURE — 45378 DIAGNOSTIC COLONOSCOPY: CPT | Performed by: SPECIALIST

## 2024-04-01 PROCEDURE — 370N000017 HC ANESTHESIA TECHNICAL FEE, PER MIN: Performed by: SPECIALIST

## 2024-04-01 PROCEDURE — G0121 COLON CA SCRN NOT HI RSK IND: HCPCS | Performed by: SPECIALIST

## 2024-04-01 PROCEDURE — G0105 COLORECTAL SCRN; HI RISK IND: HCPCS | Performed by: SPECIALIST

## 2024-04-01 PROCEDURE — 82962 GLUCOSE BLOOD TEST: CPT

## 2024-04-01 PROCEDURE — 250N000011 HC RX IP 250 OP 636: Performed by: NURSE ANESTHETIST, CERTIFIED REGISTERED

## 2024-04-01 RX ORDER — LIDOCAINE 40 MG/G
CREAM TOPICAL
Status: DISCONTINUED | OUTPATIENT
Start: 2024-04-01 | End: 2024-04-01 | Stop reason: HOSPADM

## 2024-04-01 RX ORDER — LIDOCAINE HYDROCHLORIDE 20 MG/ML
INJECTION, SOLUTION INFILTRATION; PERINEURAL PRN
Status: DISCONTINUED | OUTPATIENT
Start: 2024-04-01 | End: 2024-04-01

## 2024-04-01 RX ORDER — PROPOFOL 10 MG/ML
INJECTION, EMULSION INTRAVENOUS PRN
Status: DISCONTINUED | OUTPATIENT
Start: 2024-04-01 | End: 2024-04-01

## 2024-04-01 RX ORDER — PROPOFOL 10 MG/ML
INJECTION, EMULSION INTRAVENOUS CONTINUOUS PRN
Status: DISCONTINUED | OUTPATIENT
Start: 2024-04-01 | End: 2024-04-01

## 2024-04-01 RX ORDER — AMLODIPINE BESYLATE 5 MG/1
5 TABLET ORAL DAILY
COMMUNITY
Start: 2023-03-08

## 2024-04-01 RX ORDER — LIDOCAINE 40 MG/G
CREAM TOPICAL
Status: DISCONTINUED | OUTPATIENT
Start: 2024-04-01 | End: 2024-04-01

## 2024-04-01 RX ORDER — NALOXONE HYDROCHLORIDE 0.4 MG/ML
0.1 INJECTION, SOLUTION INTRAMUSCULAR; INTRAVENOUS; SUBCUTANEOUS
Status: DISCONTINUED | OUTPATIENT
Start: 2024-04-01 | End: 2024-04-01 | Stop reason: HOSPADM

## 2024-04-01 RX ORDER — ONDANSETRON 2 MG/ML
4 INJECTION INTRAMUSCULAR; INTRAVENOUS EVERY 30 MIN PRN
Status: DISCONTINUED | OUTPATIENT
Start: 2024-04-01 | End: 2024-04-01 | Stop reason: HOSPADM

## 2024-04-01 RX ORDER — SODIUM CHLORIDE, SODIUM LACTATE, POTASSIUM CHLORIDE, CALCIUM CHLORIDE 600; 310; 30; 20 MG/100ML; MG/100ML; MG/100ML; MG/100ML
INJECTION, SOLUTION INTRAVENOUS CONTINUOUS
Status: DISCONTINUED | OUTPATIENT
Start: 2024-04-01 | End: 2024-04-01 | Stop reason: HOSPADM

## 2024-04-01 RX ORDER — ONDANSETRON 4 MG/1
4 TABLET, ORALLY DISINTEGRATING ORAL EVERY 30 MIN PRN
Status: DISCONTINUED | OUTPATIENT
Start: 2024-04-01 | End: 2024-04-01 | Stop reason: HOSPADM

## 2024-04-01 RX ADMIN — SODIUM CHLORIDE, POTASSIUM CHLORIDE, SODIUM LACTATE AND CALCIUM CHLORIDE 10 ML/HR: 600; 310; 30; 20 INJECTION, SOLUTION INTRAVENOUS at 08:06

## 2024-04-01 RX ADMIN — LIDOCAINE HYDROCHLORIDE 25 MG: 20 INJECTION, SOLUTION INFILTRATION; PERINEURAL at 08:36

## 2024-04-01 RX ADMIN — PROPOFOL 80 MG: 10 INJECTION, EMULSION INTRAVENOUS at 08:36

## 2024-04-01 RX ADMIN — PROPOFOL 150 MCG/KG/MIN: 10 INJECTION, EMULSION INTRAVENOUS at 08:38

## 2024-04-01 ASSESSMENT — ACTIVITIES OF DAILY LIVING (ADL)
ADLS_ACUITY_SCORE: 35
ADLS_ACUITY_SCORE: 35

## 2024-04-01 ASSESSMENT — LIFESTYLE VARIABLES: TOBACCO_USE: 1

## 2024-04-01 ASSESSMENT — ENCOUNTER SYMPTOMS: DYSRHYTHMIAS: 1

## 2024-04-01 NOTE — DISCHARGE INSTRUCTIONS
[facility]    Home Care Following Endoscopy          Activity:  You have just undergone an endoscopic procedure usually performed with conscious sedation.  Do not work or operate machinery (including a car) for at least 12 hours.    I encourage you to walk and attempt to pass this air as soon as possible.    Diet:  Return to the diet you were on before your procedure but eat lightly for the first 12-24 hours.  Drink plenty of water.  Resume any regular medications unless otherwise advised by your physician.  Please begin any new medication prescribed as a result of your procedure as directed by your physician.   If you had any biopsy or polyp removed please refrain from aspirin or aspirin products for 2 days.  If on Coumadin please restart as instructed by your physician.   Pain:  You may take Tylenol as needed for pain.  Expected Recovery:  You can expect some mild abdominal fullness and/or discomfort due to the air used to inflate your intestinal tract. It is also normal to have a mild sore throat after upper endoscopy.    Call Your Physician if You Have:    After Colonoscopy:  Worsening persisting abdominal pain which is worse with activity.  Fevers (>101 degrees F), chills or shakes.  Passage of continued blood with bowel movements.     Any questions or concerns about your recovery, please call [phone] or after hours [phone] ([phone]) Nurse Advice Line.     rx printed

## 2024-04-01 NOTE — ANESTHESIA CARE TRANSFER NOTE
Patient: Carmita Horn    Procedure: Procedure(s):  Colonoscopy       Diagnosis: Screen for colon cancer [Z12.11]  Diagnosis Additional Information: No value filed.    Anesthesia Type:   MAC     Note:    Oropharynx: oropharynx clear of all foreign objects and spontaneously breathing  Level of Consciousness: drowsy  Oxygen Supplementation: face mask    Independent Airway: airway patency satisfactory and stable  Dentition: dentition unchanged  Vital Signs Stable: post-procedure vital signs reviewed and stable  Report to RN Given: handoff report given  Patient transferred to: Phase II    Handoff Report: Identifed the Patient, Identified the Reponsible Provider, Reviewed the pertinent medical history, Discussed the surgical course, Reviewed Intra-OP anesthesia mangement and issues during anesthesia, Set expectations for post-procedure period and Allowed opportunity for questions and acknowledgement of understanding      Vitals:  Vitals Value Taken Time   BP     Temp     Pulse     Resp     SpO2         Electronically Signed By: SOLOMON Rios CRNA  April 1, 2024  8:55 AM

## 2024-04-01 NOTE — ANESTHESIA PREPROCEDURE EVALUATION
Anesthesia Pre-Procedure Evaluation    Patient: Carmita Horn   MRN: [id number] : 1960        Procedure : Procedure(s):  Colonoscopy          No past medical history on file.   No past surgical history on file.   Allergies   Allergen Reactions    Penicillins Palpitations and Shortness Of Breath      Social History     Tobacco Use    Smoking status: Some Days     Types: Cigarettes     Passive exposure: Past    Smokeless tobacco: Never   Substance Use Topics    Alcohol use: Not on file      Wt Readings from Last 1 Encounters:   24 104.6 kg (230 lb 9 oz)        Anesthesia Evaluation            ROS/MED HX  ENT/Pulmonary:     (+) sleep apnea, doesn't use CPAP,              tobacco use, Current use,                       Neurologic:  - neg neurologic ROS     Cardiovascular:     (+) Dyslipidemia hypertension- -   -  - -   Taking blood thinners                     dysrhythmias, a-fib,             METS/Exercise Tolerance:     Hematologic: Comments: Plt 124      Musculoskeletal:  - neg musculoskeletal ROS     GI/Hepatic:     (+)        bowel prep,            Renal/Genitourinary:     (+)        BPH,      Endo:     (+)  type II DM, Last HgA1c: 8.2, date: 3/14/24,                  Psychiatric/Substance Use:  - neg psychiatric ROS     Infectious Disease:  - neg infectious disease ROS     Malignancy:  - neg malignancy ROS     Other:  - neg other ROS          Physical Exam    Airway  airway exam normal      Mallampati: II   TM distance: > 3 FB   Neck ROM: full   Mouth opening: > 3 cm    Respiratory Devices and Support         Dental       (+) Modest Abnormalities - crowns, retainers, 1 or 2 missing teeth      Cardiovascular   cardiovascular exam normal       Rhythm and rate: regular and normal     Pulmonary   pulmonary exam normal        breath sounds clear to auscultation           OUTSIDE LABS:  CBC:   Lab Results   Component Value Date    WBC 7.9 2024    HGB 15.2 2024    HCT 47.0 2024      "(L) 03/14/2024     BMP:   Lab Results   Component Value Date     03/14/2024    POTASSIUM 4.5 03/14/2024    CHLORIDE 105 03/14/2024    CO2 24 03/14/2024    BUN 22.4 03/14/2024    CR 1.00 03/14/2024     (H) 03/14/2024     COAGS:   Lab Results   Component Value Date    INR 1.1 01/04/2024     POC: No results found for: \"BGM\", \"HCG\", \"HCGS\"  HEPATIC:   Lab Results   Component Value Date    ALBUMIN 4.3 03/14/2024    PROTTOTAL 7.7 03/14/2024    ALT 28 03/14/2024    AST 19 03/14/2024    ALKPHOS 78 03/14/2024    BILITOTAL 0.3 03/14/2024     OTHER:   Lab Results   Component Value Date    A1C 8.2 (H) 03/14/2024    LALITA 9.4 03/14/2024       Anesthesia Plan    ASA Status:  3    NPO Status:  NPO Appropriate    Anesthesia Type: MAC.     - Reason for MAC: straight local not clinically adequate   Induction: Intravenous, Propofol.   Maintenance: TIVA.        Consents    Anesthesia Plan(s) and associated risks, benefits, and realistic alternatives discussed. Questions answered and patient/representative(s) expressed understanding.     - Discussed:     - Discussed with:  Patient      - Extended Intubation/Ventilatory Support Discussed: No.      - Patient is DNR/DNI Status: No     Use of blood products discussed: No .     Postoperative Care       PONV prophylaxis: Background Propofol Infusion     Comments:    Other Comments: The risks and benefits of anesthesia, and the alternatives where applicable, have been discussed with the patient, and they wish to proceed.              Deejay Diaz, APRN CRNA    I have reviewed the pertinent notes and labs in the chart from the past 30 days and (re)examined the patient.  Any updates or changes from those notes are reflected in this note.             # Thrombocytopenia: Lowest platelets = 127 in last 30 days, will monitor for bleeding  # DMII: A1C = 8.2 % (Ref range: <5.7 %) within past 6 months  # Obesity: Estimated body mass index is 32.1 kg/m  as calculated from the following:    " "Height as of 3/14/24: 1.805 m (5' 11.06\").    Weight as of 3/14/24: 104.6 kg (230 lb 9 oz).      "

## 2024-04-01 NOTE — LETTER
March 19, 2024      Carmita Horn  [address] [facility] [address]              Dear HOMERO Vizcarra Golytely ([place], [place])  Prep Instructions for your Colonoscopy  Pre-Assessment Phone Number: [facility]; [phone]      Please read these instructions carefully at least 7 days prior to your colonoscopy procedure. Be sure to follow all directions completely. The inside of your colon must be clean to allow for a complete examination for the presence of any growths, polyps, and/or abnormalities, as well as their biopsy or removal. A number of tips are included in order to make this part of the procedure as comfortable as possible.    Getting ready:   A nurse will call you to go over instructions and your health history.  It's important to complete the nurse assessment before your procedure. If you don't, your procedure might have to be canceled.  You must arrange for an adult to drive you home after your exam. Your colonoscopy cannot be done unless you have a ride. If you need to use public transportation, someone must ride with you and stay with you for a minimum of 6-24 hours.  Check with your insurance company to be sure they will cover this exam.  If you have diabetes:  Ask to have your exam early in the morning.  Also, ask your doctor if you should change your diet or medicines.    7 days before the exam:  Talk to your prescribing provider: If you take blood thinners (such as Coumadin, Plavix, Xarelto, Eliquis, Lovenox, or others), these medications may need to be stopped temporarily before your procedure. Your prescribing provider will tell you what to do.   Talk to your prescribing provider: If you take prescription NSAIDS (such as Sulindac, Celebrex, Mobic, Relafen). Your prescribing provider will tell you what to do.   Stop taking fiber supplements (bran, Metamucil, Fibercon), multi-vitamins with iron, and medicines that contain iron.  Continue taking prescribed aspirin; talk to your  prescribing doctor with any concerns.  Stop eating corn, popcorn, nuts and foods that contain seeds. These can stay in the colon for many days and they can clog up the colonoscope.     3 days before the exam:  Begin a low-fiber diet: No raw fruits or vegetables, whole wheat, seeds, nuts, popcorn or other high-fiber foods (see list below). No Olestra (a fat substitute).    One day before the exam:  You can have a light, low-fiber breakfast. But drink only clear liquids after 9 a.m. (see list below). Drink at least 8 to 10 full glasses of clear liquids during the day.   Stop taking NSAID pain relievers, such as Advil, Ibuprofen, Motrin, etc.  You may take Tylenol.  Fill the jug that contains the Golytely powder with warm water. Cover and shake until well mixed. Use a full gallon of water. Chill for 3 hours, but do not add ice.  Note: You will start drinking half of the Golytely solution at 6 p.m. The timing of drinking the 2nd half of the Golytely solution depends on your appointment arrival time. See Steps 1-2 below.    Step One:  At 3 p.m., take 2 tablets of Dulcolax (bisacodyl).  At 6 p.m. start drinking the Golytely solution. Drink an 8-ounce glass every 15 minutes until the jug is half empty. Drink each glass quickly.   After you start drinking the solution, stay near a toilet. You may have watery stools (diarrhea), mild cramping, bloating , and nausea.   You may want to use Vaseline on the skin around your anus after each bowel movement to prevent irritation. You can also use wet wipes to prevent irritation.    Step Two:   If you arrive before 11 AM:  At 11 p.m. on the day before your exam:  Take 2 Dulcolax (Bisacodyl) tablets.   Start drinking the other half of the Golytely jug. Drink one 8-ounce glass every 15 minutes until the jug is empty. Drink each glass quickly.  If you arrive after 11 AM:  At 6 AM on the day of the exam:  Take 2 tablets of Dulcolax (Bisacodyl).   Drink the other half of the Golytely jug.    Drink one 8-ounce glass every 15 minutes until the jug is empty.   Drink each glass quickly.   You should finish the prep 4 hours before the exam.      Day of exam:    You may drink clear liquids only up until 2 hours before your arrival time.  You may take your necessary morning medications with sips of water  Do not take diabetes medicine by mouth until after your exam.  Do not smoke, chew tobacco, or swallow anything, including water or gum for at least 2 hours before your arrival time. This is a safety issue. Your procedure could be cancelled if you do not follow directions.  Please do not wear jewelry (i.e. earrings, rings, necklaces, watches, etc) . Leave your purse, billfold, credit cards, and other valuables at home.   Please arrive with a responsible adult who can take you home after the test and stay with you for a minimum of 24 hours: The medicine used will make you sleepy and forgetful. If you do not have someone to take you home, we will cancel your procedure. If using public transportation you must have someone to ride with you.  Please perform your nebulizer treatments and airway clearance therapy in the morning prior to the procedure (if applicable).  If you have asthma, bring your inhalers.      CLEAR LIQUID DIET   You may have:  Water, tea, coffee (no milk or cream)  Soda pop, Gatorade (not red or purple)  Jell-O, Popsicles (no milk or fruit pieces - not red or purple)  Fat-free soup broth or bouillon  Plain hard candy, such as clear life savers (not red or purple)  Clear juices and fruit-flavored drinks, such as apple juice, white grape juice, Hi-C, and Marlon-Aid (not red or purple)   Do not have:  Milk or milk products such as ice cream, malts or shakes, or coffee creamer  Red or purple drinks of any kind such as cranberry juice or grape juice. Avoid red or purple Jell-O, Popsicles, Marlon-Aid, sorbet, sherbet and candy  Juices with pulp such as orange, grapefruit, pineapple or tomato juice  Cream  soups of any kind  Alcohol and beer  Protein drinks or protein powder     LOW FIBER DIET   You may have:    Starches: White bread, rolls, biscuits, croissants, [place] toast, white flour tortillas, waffles, pancakes, German toast; white rice, noodles, pasta, macaroni; cooked and peeled potatoes; plain crackers, saltines; cooked farina or cream of rice; puffed rice, corn flakes, Rice Krispies, Special K   Vegetables: tender cooked and canned, vegetable broths  Fruits and fruit juices: Strained fruit juice, canned fruit without seeds or skin (not pineapple), applesauce, pear sauce, ripe bananas, melons (not watermelon)   Milk products: Milk (plain or flavored), cheese, cottage cheese, yogurt (no berries), custard, ice cream    Proteins: Tender, well-cooked ground beef, lamb, veal, ham, pork, chicken, turkey, fish or organ meats; eggs; creamy peanut butter   Fats and condiments:  Margarine, butter, oils, mayonnaise, sour cream, salad dressing, plain gravy; spices, cooked herbs; sugar, clear jelly, honey, syrup   Snacks, sweets and drinks: Pretzels, hard candy; plain cakes and cookies (no nuts or seeds); gelatin, plain pudding, sherbet, Popsicles; coffee, tea, carbonated ( fizzy ) drinks Do not have:    Starches: Breads or rolls that contain nuts, seeds or fruit; whole wheat or whole grain breads that contain more than 1 gram of fiber per slice; cornbread; corn or whole wheat tortillas; potatoes with skin; brown rice, wild rice, kasha (buckwheat), and oatmeal   Vegetables: Any raw or steamed vegetables; vegetables with seeds; corn in any form   Fruits and fruit juices: Prunes, prune juice, raisins and other dried fruits, berries and other fruits with seeds, canned pineapple juices with pulp such as orange, grapefruit, pineapple or tomato juice  Milk products: Any yogurt with nuts, seeds or berries   Proteins: Tough, fibrous meats with gristle; cooked dried beans, peas or lentils; crunchy peanut butter  Fats and  condiments: Pickles, olives, relish, horseradish; jam, marmalade, preserves   Snacks, sweets and drinks: Popcorn, nuts, seeds, granola, coconut, candies made with nuts or seeds; all desserts that contain nuts, seeds, raisins and other dried fruits, coconut, whole grains or bran.        FAQ:    How do you know if your colon is cleaned out?   After completing the bowel prep, your bowel movements should be all liquid and yellow. Your bowel movements will look similar to urine in the toilet. If there are pieces of stool (poop) in the toilet, or if you can't see to the bottom of the toilet, please call our office for advice. Call [phone] and ask to speak with a nurse.   Why do you need a responsible  to take you home and stay with you?  We require a responsible adult to take you home for your safety. The sedation medicines used to relax you during the procedure can impair your judgement and reaction time, make you forgetful and possible a little unsteady. Do not drive, make any important decisions, or sign any legal documents for 24 hours after your procedure.   It is normal to feel bloated and gassy after your procedure. Walking will help move the air through your colon. You can take non-aspirin pain relievers that contain acetaminophen (Tylenol).   When can you eat after your procedure?  You may resume your normal diet when you feel ready, unless advised otherwise by the doctor performing your procedure. Do not drink alcohol for 24 hours after your procedure.   You many resume normal activities (work, exercise, etc.) after 24 hours.   When will you get test results?  You should have your procedure results and any lab results (if applicable) by letter, MyChart message, or phone call within 2 weeks. If you have any questions, please call the doctor that referred you for the procedure.       Thank you for choosing  [facility] [place] for your procedure. If you are sent a survey regarding your care, please take  the time to complete the questionnaire. We value your feedback!             Updated: 6/22/2022        Sincerely,       RN, CNP

## 2024-04-01 NOTE — ANESTHESIA POSTPROCEDURE EVALUATION
Patient: Carmita Horn    Procedure: Procedure(s):  Colonoscopy       Anesthesia Type:  MAC    Note:  Disposition: Outpatient   Postop Pain Control: Uneventful            Sign Out: Well controlled pain   PONV: No   Neuro/Psych: Uneventful            Sign Out: Acceptable/Baseline neuro status   Airway/Respiratory: Uneventful            Sign Out: Acceptable/Baseline resp. status   CV/Hemodynamics: Uneventful            Sign Out: Acceptable CV status   Other NRE: NONE   DID A NON-ROUTINE EVENT OCCUR? No    Event details/Postop Comments:  Pt was happy with anesthesia care.  No complications.  I will follow up with the pt if needed.           Last vitals:  Vitals Value Taken Time   /94 04/01/24 0910   Temp     Pulse 91 04/01/24 0910   Resp     SpO2 97 % 04/01/24 0904   Vitals shown include unfiled device data.    Electronically Signed By: SOLOMON Rios CRNA  April 1, 2024  9:13 AM

## 2024-04-03 ENCOUNTER — TELEPHONE (OUTPATIENT)
Dept: OPHTHALMOLOGY | Facility: CLINIC | Age: 64
End: 2024-04-03

## 2024-04-03 ENCOUNTER — OFFICE VISIT (OUTPATIENT)
Dept: OPHTHALMOLOGY | Facility: CLINIC | Age: 64
End: 2024-04-03
Attending: PHYSICIAN ASSISTANT
Payer: COMMERCIAL

## 2024-04-03 DIAGNOSIS — H02.9 EYELID LESION: Primary | ICD-10-CM

## 2024-04-03 PROCEDURE — 88305 TISSUE EXAM BY PATHOLOGIST: CPT | Performed by: OPHTHALMOLOGY

## 2024-04-03 PROCEDURE — 99207 PR BUNDLED PROCEDURE IN GLOBAL PKG: CPT | Performed by: OPHTHALMOLOGY

## 2024-04-03 PROCEDURE — 67810 INCAL BX EYELID SKN LID MRGN: CPT | Mod: E1 | Performed by: OPHTHALMOLOGY

## 2024-04-03 PROCEDURE — 92285 EXTERNAL OCULAR PHOTOGRAPHY: CPT | Performed by: OPHTHALMOLOGY

## 2024-04-03 RX ORDER — ERYTHROMYCIN 5 MG/G
OINTMENT OPHTHALMIC ONCE
Status: COMPLETED | OUTPATIENT
Start: 2024-04-03 | End: 2024-04-03

## 2024-04-03 RX ADMIN — ERYTHROMYCIN: 5 OINTMENT OPHTHALMIC at 13:06

## 2024-04-03 ASSESSMENT — CONF VISUAL FIELD
OS_SUPERIOR_NASAL_RESTRICTION: 0
OS_INFERIOR_TEMPORAL_RESTRICTION: 0
OS_SUPERIOR_TEMPORAL_RESTRICTION: 0
OD_NORMAL: 1
METHOD: COUNTING FINGERS
OS_INFERIOR_NASAL_RESTRICTION: 0
OD_INFERIOR_TEMPORAL_RESTRICTION: 0
OD_SUPERIOR_NASAL_RESTRICTION: 0
OD_SUPERIOR_TEMPORAL_RESTRICTION: 0
OS_NORMAL: 1
OD_INFERIOR_NASAL_RESTRICTION: 0

## 2024-04-03 ASSESSMENT — TONOMETRY
OS_IOP_MMHG: 10
IOP_METHOD: ICARE
OD_IOP_MMHG: 10

## 2024-04-03 ASSESSMENT — VISUAL ACUITY
OS_SC: 20/15
OD_SC: 20/15
METHOD: SNELLEN - LINEAR
OS_SC+: -3

## 2024-04-03 ASSESSMENT — SLIT LAMP EXAM - LIDS: COMMENTS: NORMAL

## 2024-04-03 NOTE — PROGRESS NOTES
Oculoplastic Clinic New Patient    Patient: Carmita Horn MRN# [id number]   YOB: 1960 Age: 63 year old   Date of Visit: Apr 3, 2024         CC: Eyelid lesion    Chief Complaint(s) and History of Present Illness(es)     Skin Tags           Comments    Here for skin tag above left eyelid. [facility] doing fine. No pain.    Luciano Gallo COT 12:13 PM April 3, 2024        Referred by Juan Manuel Villanueva PA-C for eyelid lesion.                HPI:     Carmita Horn is a 63 year old male who has noted a lesion on the left upper eyelid. It has been present for 4 years. He previously had one excised but has recurred.    Enlarging: Yes  Irritating to eyelashes and catches in folds of skin: Yes  Bleeding: No  Prior cutaneous malignancy: No  Immunosuppression: No           Assessment and Plan:   1. left upper eyelid lesion     Excise as now causing significant eyelid irritation catching in his dermatochalasis.    He is holding his eliquis as he had a procedure yesterday so would like it done today.        Operative Note - Eyelid Biopsy      Pre-operative Diagnosis: Lesion left upper Eyelid    Post-operative Diagnosis: Same.    Procedure: Excision of eyelid neoplasm.    Surgeon: Jason Olivares MD    Anesthesia: Local infiltration with 2% Lidocaine and Epinephrine.    Complications: None.    Estimated blood loss: <5 mL    Specimen: Eyelid neoplasm to pathology.    Procedure: The patient was brought to the minor procedure room and placed supine on the operating table.  The involved eyelid was infiltrated with local anesthetic.  The area was prepped and draped in the typical sterile fashion.  A tooth forceps was used to elevate the lesion and it was excised at its base with a Jane scissors.  Hemostasis was obtained with a high temperature cautery.  The excised diameter measured 3 mm.      Closure of wound: granulation    Dressing: The wound was dressed with Erythromycin ophthalmic ointment.     The patient left the minor  procedure room in stable condition.    I was present for the entire procedure. Jason Olivares MD                Attending Physician Attestation: Complete documentation of historical and exam elements from today's encounter can be found in the full encounter summary report (not reduplicated in this progress note). I personally obtained the chief complaint(s) and history of present illness. I confirmed and edited as necessary the review of systems, past medical/surgical history, family history, social history, and examination findings as documented by others; and I examined the patient myself. I personally reviewed the relevant tests, images, and reports as documented above. I formulated and edited as necessary the assessment and plan and discussed the findings and management plan with the patient. Jason Olivares MD    Today with Carmita Horn, I reviewed the indications, risks, benefits, and alternatives of the proposed surgical procedure including, but not limited to, failure obtain the desired result  and need for additional surgery, bleeding, infection, loss of vision, injury to the eye..  I provided multiple opportunities for the questions, answered all questions to the best of my ability, and confirmed that my answers and my discussion were understood. Jason Olivares MD

## 2024-04-03 NOTE — LETTER
4/3/2024         RE:  :  MRN: Carmita Horn  1960  [id number]     Dear Dr. Juan Manuel Villanueva,    Thank you for asking me to see your patient, Carmita Horn, for an oculoplastic   consultation.  My assessment and plan are below.  For further details, please see my attached clinic note.      CC: Eyelid lesion    Chief Complaint(s) and History of Present Illness(es)     Skin Tags           Comments    Here for skin tag above left eyelid. VA doing fine. No pain.    Luciano Cleo COT 12:13 PM April 3, 2024        Referred by Juan Manuel Villanueva PA-C for eyelid lesion.                HPI:     Carmita Horn is a 63 year old male who has noted a lesion on the left upper eyelid. It has been present for 4 years. He previously had one excised but has recurred.    Enlarging: Yes  Irritating to eyelashes and catches in folds of skin: Yes  Bleeding: No  Prior cutaneous malignancy: No  Immunosuppression: No           Assessment and Plan:   1. left upper eyelid lesion     Excise as now causing significant eyelid irritation catching in his dermatochalasis.    He is holding his eliquis as he had a procedure yesterday so would like it done today.        Operative Note - Eyelid Biopsy      Pre-operative Diagnosis: Lesion left upper Eyelid    Post-operative Diagnosis: Same.    Procedure: Excision of eyelid neoplasm.    Surgeon: Jason Olivares MD    Anesthesia: Local infiltration with 2% Lidocaine and Epinephrine.    Complications: None.    Estimated blood loss: <5 mL    Specimen: Eyelid neoplasm to pathology.    Procedure: The patient was brought to the minor procedure room and placed supine on the operating table.  The involved eyelid was infiltrated with local anesthetic.  The area was prepped and draped in the typical sterile fashion.  A tooth forceps was used to elevate the lesion and it was excised at its base with a Jane scissors.  Hemostasis was obtained with a high temperature cautery.  The excised diameter measured 3  mm.      Closure of wound: granulation    Dressing: The wound was dressed with Erythromycin ophthalmic ointment.     The patient left the minor procedure room in stable condition.    I was present for the entire procedure. Jason Olivares MD               Again, thank you for allowing me to participate in the care of your patient.      Sincerely,    Jason Olivares MD  Department of Ophthalmology and Visual Neurosciences  [facility]    CC: Juan Manuel Villanueva PA-C  [address] [place] [id number]  Via In Basket

## 2024-04-03 NOTE — PATIENT INSTRUCTIONS
Use cold compresses for the first 48 hours while awake to minimize bruising and swelling. It works well to put a washcloth in a bowl of ice water and use the cold washcloth.     Use a warm compress 5-10 minutes 4 times per day for the next 2 days or as needed prior to next appointment.     Apply the prescribed/provided ointment to the incision three times a day for 5 days.

## 2024-04-03 NOTE — NURSING NOTE
Chief Complaints and History of Present Illnesses   Patient presents with    Skin Tags     Chief Complaint(s) and History of Present Illness(es)       Skin Tags               Comments    Here for skin tag above left eyelid. [facility] doing fine. No pain.    Luciano Gallo COT 12:13 PM April 3, 2024

## 2024-04-03 NOTE — TELEPHONE ENCOUNTER
Spoke with patient regarding scheduling from the wait-list for an earlier same day appointment on 4/3/24. Patient rescheduled as offered and is aware of time and location.-Per Patient

## 2024-04-03 NOTE — LETTER
"April 5, 2024      Carmita Horn  [address] [facility] [id number]        Dear Carmita,    Please see below for your test results. The lesion was benign (not worrisome). As long as you are healing well, no further care should be necessary for the area. You can continue to apply the prescribed ointment, or Vaseline or Aquaphor to the area until it has healed nicely. If you have any concerns or recurrence, please return for further evaluation.       Resulted Orders   Surgical Pathology Exam   Result Value Ref Range    Case Report       Surgical Pathology Report                         Case: [id number]                                  Authorizing Provider:  Jason Olivares MD      Collected:           04/03/2024 01:06 PM          Ordering Location:     [facility]   Received:            04/03/2024 02:34 PM                                 [place]                                                                  Pathologist:           Gwendolyn Rizzo MD                                                         Specimen:    Eyelid, Upper, Left                                                                        Final Diagnosis       Upper eyelid, left, biopsy: Seborrheic keratosis.       Clinical Information       The patient is a 63 year old male with a lesion of the left upper eyelid. He undergoes biopsy of the lesion on the left.       Gross Description       A(A). Eyelid, Upper, Left, Eyelid, Upper, Left:  The specimen is received in formalin with proper patient identification, labeled \"eyelid, upper, left\".  The specimen consists of a 0.7 x 0.4 x 0.2 cm tan, raised lesion with a 0.3 x 0.1 cm resection margin.  The resection margin is inked and it is bisected and submitted in A1.       Microscopic Description       The tissue consists of skin with hyperkeratosis, acanthosis, and papillomatosis. Occasional pseudohorn cysts are present.         Performing Labs       The technical component of " this testing was completed at [facility]      Case Images      PATHOLOGIST REMOTE SIGNOUT LOCATION       Report signed out at: ACM1         If you have any questions, please call the clinic to make an appointment.    Sincerely,    Jason Olivares MD  Ophthalmic Plastic & Reconstructive Surgery  Department of Ophthalmology & Visual Neurosciences    [facility]

## 2024-04-05 LAB
PATH REPORT.COMMENTS IMP SPEC: NORMAL
PATH REPORT.FINAL DX SPEC: NORMAL
PATH REPORT.GROSS SPEC: NORMAL
PATH REPORT.MICROSCOPIC SPEC OTHER STN: NORMAL
PATH REPORT.RELEVANT HX SPEC: NORMAL
PHOTO IMAGE: NORMAL

## 2024-04-15 ENCOUNTER — OFFICE VISIT (OUTPATIENT)
Dept: FAMILY MEDICINE | Facility: CLINIC | Age: 64
End: 2024-04-15
Payer: COMMERCIAL

## 2024-04-15 VITALS
BODY MASS INDEX: 32.37 KG/M2 | OXYGEN SATURATION: 97 % | WEIGHT: 231.2 LBS | SYSTOLIC BLOOD PRESSURE: 104 MMHG | HEART RATE: 100 BPM | HEIGHT: 71 IN | TEMPERATURE: 97.3 F | RESPIRATION RATE: 20 BRPM | DIASTOLIC BLOOD PRESSURE: 68 MMHG

## 2024-04-15 DIAGNOSIS — G62.9 NEUROPATHY: ICD-10-CM

## 2024-04-15 DIAGNOSIS — I50.22 CHRONIC SYSTOLIC CONGESTIVE HEART FAILURE (H): ICD-10-CM

## 2024-04-15 DIAGNOSIS — E11.9 TYPE 2 DIABETES MELLITUS WITHOUT COMPLICATION, WITHOUT LONG-TERM CURRENT USE OF INSULIN (H): Primary | ICD-10-CM

## 2024-04-15 PROBLEM — I50.9 CHF (CONGESTIVE HEART FAILURE) (H): Status: ACTIVE | Noted: 2024-04-15

## 2024-04-15 PROCEDURE — 99214 OFFICE O/P EST MOD 30 MIN: CPT | Performed by: FAMILY MEDICINE

## 2024-04-15 RX ORDER — GABAPENTIN 100 MG/1
100 CAPSULE ORAL AT BEDTIME
Qty: 90 CAPSULE | Refills: 1 | Status: SHIPPED | OUTPATIENT
Start: 2024-04-15

## 2024-04-15 RX ORDER — METOPROLOL SUCCINATE 100 MG/1
1 TABLET, EXTENDED RELEASE ORAL
COMMUNITY
Start: 2024-03-22

## 2024-04-15 RX ORDER — BLOOD SUGAR DIAGNOSTIC
STRIP MISCELLANEOUS
COMMUNITY
Start: 2023-03-08

## 2024-04-15 ASSESSMENT — PAIN SCALES - GENERAL: PAINLEVEL: NO PAIN (0)

## 2024-04-15 NOTE — PROGRESS NOTES
Assessment & Plan     Type 2 diabetes mellitus without complication, without long-term current use of insulin (H)  Dayo is a 63-year-old male with a history of type II non-insulin-dependent diabetes, atrial fibrillation with rapid ventricular response, heart failure with reduced ejection fraction, ischemic emboli who follows up after our initial visit 1 month ago.  His initial visit was posthospital follow-up following thrombectomy.  He was noted to be in persistent atrial fibrillation with rapid ventricular response and with heart failure felt secondary to this.  He was followed up after last visit with cardiology who increased his beta-blocker because he persisted in atrial fibrillation with uncontrolled rate.  He is currently on Eliquis for anticoagulation.  He is currently on metformin 1000 mg twice daily for his diabetes.  His last hemoglobin A1c 1 month ago was 8.1.  He is checking his blood sugars and they are starting to stabilize.    He brings in all of his medications today and we reviewed them.    On exam his blood pressure is 104/68.  Pulse was 100 and irregular.  Lungs are clear.  Cardiac exam is irregularly irregular with no murmur.  Extremities show warm feet with strong bilateral dorsalis pedis pulses and good capillary refill.  Sensation is slightly diminished at the toes to light touch and pinprick.    Assessment: Type 2 diabetes without insulin.  Continue current medications and plan.  He is currently on metformin, lisinopril 5 mg once daily aspirin and Eliquis.  Continue current medication and plan.  Follow-up in 2 months for recheck of hemoglobin A1c.      - PRIMARY CARE FOLLOW-UP SCHEDULING; Future    Neuropathy  Pablo to 89 male with peripheral neuropathy most likely related to his diabetes.  He has strong bilateral dorsalis pedis pulses and good capillary refill.  No preulcerative changes to his feet.  He has slight diminished sensation to light touch at the toes.  Will start him on  gabapentin 100 mg at bedtime with plan for follow-up and titration to higher effective dosing.  - gabapentin (NEURONTIN) 100 MG capsule; Take 1 capsule (100 mg) by mouth at bedtime    Prescription drug management          SELF MONITORING:       - Please check blood glucose readings twice daily before meals       - Please check blood pressure readings daily   Follow-up Visit   Expected date:  Cesar 15, 2024 (Approximate)      Follow Up Appointment Details:     Follow-up with whom?: Me    Follow-Up for what?: Chronic Disease f/u    Chronic Disease f/u: Diabetes    How?: In Person    Is this an as-needed follow-up?: No                       Subjective   Mauro is a 63 year old, presenting for the following health issues:  Recheck Medication        4/15/2024    10:38 AM   Additional Questions   Roomed by Fiorella SEGOVIA     History of Present Illness       Diabetes:   He presents for follow up of diabetes.  He is checking home blood glucose a few times a month.   He checks blood glucose after meals.  Blood glucose is never over 200 and never under 70.     He has no concerns regarding his diabetes at this time.  He is having numbness in feet.  The patient has not had a diabetic eye exam in the last 12 months.          He eats 2-3 servings of fruits and vegetables daily.He consumes 3 sweetened beverage(s) daily.He exercises with enough effort to increase his heart rate 10 to 19 minutes per day.  He exercises with enough effort to increase his heart rate 6 days per week.   He is taking medications regularly.         Diabetes Follow-up    How often are you checking your blood sugar? Two times daily  Blood sugar testing frequency justification:  Uncontrolled diabetes and Patient modifying lifestyle changes (diet, exercise) with blood sugars  What time of day are you checking your blood sugars (select all that apply)?  Before meals  Have you had any blood sugars above 200?  Yes rare  Have you had any blood sugars below 70?  No  What  symptoms do you notice when your blood sugar is low?  None  What concerns do you have today about your diabetes? Other: neuropathy   Do you have any of these symptoms? (Select all that apply)  Numbness in feet  Have you had a diabetic eye exam in the last 12 months? No        BP Readings from Last 2 Encounters:   04/15/24 104/68   04/01/24 (!) 135/96     Hemoglobin A1C (%)   Date Value   03/14/2024 8.2 (H)     LDL Cholesterol Calculated (no units)   Date Value   03/14/2024      Comment:     Cannot estimate LDL when triglyceride exceeds 400 mg/dL     LDL Cholesterol Direct (mg/dL)   Date Value   03/14/2024 118 (H)             Atrial Fibrillation Follow-up    Symptoms: no recent chest pain, significant palpitations, dizziness/lightheadedness, dyspnea, or increased peripheral edema.  Stroke prevention: DOAC (Eliquis, Xarelto, Pradaxa)        4/1/2024     9:00 AM 4/1/2024     9:10 AM 4/1/2024     9:20 AM 4/1/2024     9:30 AM 4/15/2024    10:49 AM   Date   Pulse 69 91 91 101 100     Current [id number] Score: 5 points - A score of 5 or greater represents a 7.2 - 12.2% annual risk of major embolic event, without anti-coagulation or an LAAO device.       Heart Failure Follow-up   Are you experiencing any shortness of breath? Yes, with activity only     How would you describe your shortness of breath?  Improved  Are you experiencing any swelling in your legs or feet?  No  Are you using more pillows than usual? No  Do you cough at night?  No  Do you check your weight daily?  Yes  Have you had a weight change recently?  No  Are you having any of the following side effects from your medications? (Select all that apply)  Other:  Insomnia  Since your last visit, how many times have you gone to the cardiologist, urgent care, emergency room, or hospital because of your heart failure?   None  Last Echo: No results found.      Patient Active Problem List   Diagnosis    Acute lower limb ischemia    Atrial fibrillation with rapid  ventricular response (H)    BPH (benign prostatic hyperplasia)    Essential hypertension    Type 2 diabetes mellitus without complication, without long-term current use of insulin (H)    Pain in joint, shoulder region     Current Outpatient Medications   Medication Sig Dispense Refill    amLODIPine (NORVASC) 5 MG tablet Take 5 mg by mouth daily      apixaban ANTICOAGULANT (ELIQUIS) 5 MG tablet Take 1 tablet (5 mg) by mouth 2 times daily 180 tablet 1    atorvastatin (LIPITOR) 20 MG tablet Take 20 mg by mouth daily      blood glucose (ACCU-CHEK GUIDE) test strip Use to test two times a day.      lisinopril (ZESTRIL) 5 MG tablet Take 1 tablet by mouth daily      metFORMIN (GLUCOPHAGE XR) 500 MG 24 hr tablet Take 1,000 mg by mouth      metoprolol succinate ER (TOPROL XL) 100 MG 24 hr tablet Take 1 tablet by mouth daily at 2 pm      tamsulosin (FLOMAX) 0.4 MG capsule Take 0.8 mg by mouth      bisacodyl (DULCOLAX) 5 MG EC tablet Take 2 tablets at 3 pm the day before your procedure. If your procedure is before 11 am, take 2 additional tablets at 11 pm. If your procedure is after 11 am, take 2 additional tablets at 6 am. For additional instructions refer to your colonoscopy prep instructions. (Patient not taking: Reported on 4/15/2024) 4 tablet 0    polyethylene glycol (GOLYTELY) 236 g suspension The night before the exam at 6 pm drink an 8-ounce glass every 15 minutes until the jug is half empty. If you arrive before 11 AM: Drink the other half of the Golytely jug at 11 PM night before procedure. If you arrive after 11 AM: Drink the other half of the Golytely jug at 6 AM day of procedure. For additional instructions refer to your colonoscopy prep instructions. (Patient not taking: Reported on 4/15/2024) 4000 mL 0           Review of Systems  CONSTITUTIONAL: NEGATIVE for fever, chills, change in weight  ENT/MOUTH: NEGATIVE for ear, mouth and throat problems  RESP: NEGATIVE for significant cough or SOB  CV: NEGATIVE for  "chest pain, palpitations or peripheral edema  NEURO: POSITIVE for paresthesias in toes  ROS otherwise negative      Objective    /68   Pulse 100   Temp 97.3  F (36.3  C) (Temporal)   Resp 20   Ht 1.803 m (5' 11\")   Wt 104.9 kg (231 lb 3.2 oz)   SpO2 97%   BMI 32.25 kg/m    Body mass index is 32.25 kg/m .  Physical Exam   GENERAL: alert and no distress  NECK: no adenopathy, no asymmetry, masses, or scars  RESP: lungs clear to auscultation - no rales, rhonchi or wheezes  CV: irregularly irregular rhythm, normal S1 S2, no S3 or S4, no murmur, click or rub, peripheral pulses strong, and no peripheral edema  ABDOMEN: soft, nontender, no hepatosplenomegaly, no masses and bowel sounds normal  MS: no gross musculoskeletal defects noted, no edema  Diabetic foot exam: normal DP and PT pulses, no trophic changes or ulcerative lesions, and reduced sensation at to toes bilaterally    Office Visit on 04/03/2024   Component Date Value Ref Range Status    Case Report 04/03/2024    Final                    Value:Surgical Pathology Report                         Case: [id number]                                  Authorizing Provider:  Jason Olivares MD      Collected:           04/03/2024 01:06 PM          Ordering Location:     [facility]   Received:            04/03/2024 02:34 PM                                 [place]                                                                  Pathologist:           Gwendolyn Rizzo MD                                                         Specimen:    Eyelid, Upper, Left                                                                        Final Diagnosis 04/03/2024    Final                    Value:This result contains rich text formatting which cannot be displayed here.    Clinical Information 04/03/2024    Final                    Value:This result contains rich text formatting which cannot be displayed here.    Gross Description 04/03/2024    Final    "                 Value:This result contains rich text formatting which cannot be displayed here.    Microscopic Description 04/03/2024    Final                    Value:This result contains rich text formatting which cannot be displayed here.    Performing Labs 04/03/2024    Final                    Value:This result contains rich text formatting which cannot be displayed here.    PATHOLOGIST REMOTE SIGNOUT LOCATION 04/03/2024    Final                    Value:This result contains rich text formatting which cannot be displayed here.           Signed Electronically by: Colt Suarez MD

## 2024-05-23 DIAGNOSIS — N40.0 BENIGN PROSTATIC HYPERPLASIA, UNSPECIFIED WHETHER LOWER URINARY TRACT SYMPTOMS PRESENT: Primary | ICD-10-CM

## 2024-05-24 RX ORDER — TAMSULOSIN HYDROCHLORIDE 0.4 MG/1
0.8 CAPSULE ORAL DAILY
Qty: 180 CAPSULE | Refills: 0 | Status: SHIPPED | OUTPATIENT
Start: 2024-05-24

## 2024-11-11 ENCOUNTER — OFFICE VISIT (OUTPATIENT)
Dept: FAMILY MEDICINE | Facility: CLINIC | Age: 64
End: 2024-11-11
Payer: COMMERCIAL

## 2024-11-11 VITALS
TEMPERATURE: 97.8 F | SYSTOLIC BLOOD PRESSURE: 140 MMHG | BODY MASS INDEX: 31.95 KG/M2 | WEIGHT: 235.9 LBS | OXYGEN SATURATION: 98 % | HEART RATE: 100 BPM | HEIGHT: 72 IN | DIASTOLIC BLOOD PRESSURE: 80 MMHG | RESPIRATION RATE: 14 BRPM

## 2024-11-11 DIAGNOSIS — I10 ESSENTIAL HYPERTENSION: ICD-10-CM

## 2024-11-11 DIAGNOSIS — I50.9 CHRONIC CONGESTIVE HEART FAILURE, UNSPECIFIED HEART FAILURE TYPE (H): ICD-10-CM

## 2024-11-11 DIAGNOSIS — N40.0 BENIGN PROSTATIC HYPERPLASIA, UNSPECIFIED WHETHER LOWER URINARY TRACT SYMPTOMS PRESENT: ICD-10-CM

## 2024-11-11 DIAGNOSIS — I48.91 ATRIAL FIBRILLATION WITH RAPID VENTRICULAR RESPONSE (H): ICD-10-CM

## 2024-11-11 DIAGNOSIS — Z00.00 ROUTINE GENERAL MEDICAL EXAMINATION AT A HEALTH CARE FACILITY: Primary | ICD-10-CM

## 2024-11-11 DIAGNOSIS — E11.9 TYPE 2 DIABETES MELLITUS WITHOUT COMPLICATION, WITHOUT LONG-TERM CURRENT USE OF INSULIN (H): ICD-10-CM

## 2024-11-11 LAB
ANION GAP SERPL CALCULATED.3IONS-SCNC: 11 MMOL/L (ref 7–15)
BUN SERPL-MCNC: 17.1 MG/DL (ref 8–23)
CALCIUM SERPL-MCNC: 9.6 MG/DL (ref 8.8–10.4)
CHLORIDE SERPL-SCNC: 100 MMOL/L (ref 98–107)
CREAT SERPL-MCNC: 0.91 MG/DL (ref 0.67–1.17)
CREAT UR-MCNC: 117.3 MG/DL
EGFRCR SERPLBLD CKD-EPI 2021: >90 ML/MIN/1.73M2
ERYTHROCYTE [DISTWIDTH] IN BLOOD BY AUTOMATED COUNT: 12.7 % (ref 10–15)
EST. AVERAGE GLUCOSE BLD GHB EST-MCNC: 212 MG/DL
GLUCOSE SERPL-MCNC: 187 MG/DL (ref 70–99)
HBA1C MFR BLD: 9 %
HCO3 SERPL-SCNC: 25 MMOL/L (ref 22–29)
HCT VFR BLD AUTO: 46.2 % (ref 40–53)
HGB BLD-MCNC: 15.6 G/DL (ref 13.3–17.7)
MCH RBC QN AUTO: 31.1 PG (ref 26.5–33)
MCHC RBC AUTO-ENTMCNC: 33.8 G/DL (ref 31.5–36.5)
MCV RBC AUTO: 92 FL (ref 78–100)
MICROALBUMIN UR-MCNC: 49.3 MG/L
MICROALBUMIN/CREAT UR: 42.03 MG/G CR (ref 0–17)
PLAT MORPH BLD: ABNORMAL
PLATELET # BLD AUTO: 126 10E3/UL (ref 150–450)
POLYCHROMASIA BLD QL SMEAR: SLIGHT
POTASSIUM SERPL-SCNC: 4.4 MMOL/L (ref 3.4–5.3)
RBC # BLD AUTO: 5.01 10E6/UL (ref 4.4–5.9)
RBC MORPH BLD: ABNORMAL
SODIUM SERPL-SCNC: 136 MMOL/L (ref 135–145)
WBC # BLD AUTO: 7.5 10E3/UL (ref 4–11)

## 2024-11-11 PROCEDURE — 80048 BASIC METABOLIC PNL TOTAL CA: CPT | Performed by: FAMILY MEDICINE

## 2024-11-11 PROCEDURE — 99207 PR FOOT EXAM NO CHARGE: CPT | Performed by: FAMILY MEDICINE

## 2024-11-11 PROCEDURE — 83036 HEMOGLOBIN GLYCOSYLATED A1C: CPT | Performed by: FAMILY MEDICINE

## 2024-11-11 PROCEDURE — 99214 OFFICE O/P EST MOD 30 MIN: CPT | Mod: 25 | Performed by: FAMILY MEDICINE

## 2024-11-11 PROCEDURE — 85027 COMPLETE CBC AUTOMATED: CPT | Performed by: FAMILY MEDICINE

## 2024-11-11 PROCEDURE — 99396 PREV VISIT EST AGE 40-64: CPT | Performed by: FAMILY MEDICINE

## 2024-11-11 PROCEDURE — 82043 UR ALBUMIN QUANTITATIVE: CPT | Performed by: FAMILY MEDICINE

## 2024-11-11 PROCEDURE — 36415 COLL VENOUS BLD VENIPUNCTURE: CPT | Performed by: FAMILY MEDICINE

## 2024-11-11 PROCEDURE — 82570 ASSAY OF URINE CREATININE: CPT | Performed by: FAMILY MEDICINE

## 2024-11-11 RX ORDER — LISINOPRIL 5 MG/1
5 TABLET ORAL DAILY
Qty: 90 TABLET | Refills: 3 | Status: SHIPPED | OUTPATIENT
Start: 2024-11-11

## 2024-11-11 RX ORDER — METFORMIN HYDROCHLORIDE 500 MG/1
1000 TABLET, EXTENDED RELEASE ORAL 2 TIMES DAILY WITH MEALS
Qty: 360 TABLET | Refills: 3 | Status: SHIPPED | OUTPATIENT
Start: 2024-11-11

## 2024-11-11 RX ORDER — ATORVASTATIN CALCIUM 20 MG/1
20 TABLET, FILM COATED ORAL DAILY
Qty: 90 TABLET | Refills: 4 | Status: SHIPPED | OUTPATIENT
Start: 2024-11-11

## 2024-11-11 RX ORDER — TAMSULOSIN HYDROCHLORIDE 0.4 MG/1
0.8 CAPSULE ORAL DAILY
Qty: 180 CAPSULE | Refills: 4 | Status: SHIPPED | OUTPATIENT
Start: 2024-11-11

## 2024-11-11 RX ORDER — METOPROLOL SUCCINATE 100 MG/1
50 TABLET, EXTENDED RELEASE ORAL
Qty: 90 TABLET | Refills: 4 | Status: SHIPPED | OUTPATIENT
Start: 2024-11-11

## 2024-11-11 RX ORDER — AMLODIPINE BESYLATE 5 MG/1
5 TABLET ORAL DAILY
Qty: 90 TABLET | Refills: 4 | Status: SHIPPED | OUTPATIENT
Start: 2024-11-11

## 2024-11-11 SDOH — HEALTH STABILITY: PHYSICAL HEALTH: ON AVERAGE, HOW MANY DAYS PER WEEK DO YOU ENGAGE IN MODERATE TO STRENUOUS EXERCISE (LIKE A BRISK WALK)?: 5 DAYS

## 2024-11-11 SDOH — HEALTH STABILITY: PHYSICAL HEALTH: ON AVERAGE, HOW MANY MINUTES DO YOU ENGAGE IN EXERCISE AT THIS LEVEL?: 20 MIN

## 2024-11-11 ASSESSMENT — SOCIAL DETERMINANTS OF HEALTH (SDOH): HOW OFTEN DO YOU GET TOGETHER WITH FRIENDS OR RELATIVES?: ONCE A WEEK

## 2024-11-11 NOTE — PROGRESS NOTES
Preventive Care Visit  [facility]  Colt Suarez MD, Family Medicine  Nov 11, 2024      Assessment & Plan     Routine general medical examination at a health care facility  Carmita Horn is a 63-year-old male who presents to clinic today for his annual preventive visit.  His past medical history is primarily pertinent for type 2 diabetes without long-term insulin therapy, hypertension, chronic atrial fibrillation, chronic congestive heart failure, benign prostatic hypertrophy with lower urinary tract symptoms.  He presents to clinic today in stable health but is confused as to which medications he is actually taking today.  He brings in all of his medications fortunately to review.  He has previously been on metoprolol for blood pressure control and rate control.  He discontinued taking this several months ago because he felt that it made him feel too hot and sweaty.  He finds that it is also inconvenient as far as timing of when to take pills.    All age and gender specific screening recommendations were reviewed today.  He has a family history for colon cancer and underwent colonoscopy in April 2024.  Entire colon was negative for polyps but did recommend a 3-year follow-up because of his family history.  He is a active smoker with a 45-pack-year history of smoking.  We discussed lung cancer screening by low-dose of CT.  He will decline at this time.  He did undergo CT chest abdomen and pelvis in January 2024 without evidence for any nodules at that time.    All recommended vaccines and schedules were reviewed today.  He declines all vaccination and COVID including seasonal influenza, COVID vaccination, shingles vaccination, RSV vaccination and pneumonia vaccine.    On exam this is an well-developed moderately overweight middle-age male in no acute distress.  His blood pressure is elevated 140/80 and pulse is 100.  He is afebrile.  Respiratory rate is 14 with oxygen saturations of  98% on room air.  His weight is 235 pounds for a body mass index of 32.3.    HEENT exam: Normocephalic atraumatic.  Pupils equally round reactive to light and accommodation and extraocular muscles are intact.  TMs are clear bilaterally.  Oropharynx is clear, tongue projects midline and palate raises symmetrically.  Neck is supple without adenopathy, thyromegaly, nodules or carotid bruits.  Lungs are clear to auscultation without wheezing rales or rhonchi.  Cardiac exam is irregularly irregular with no murmur.  Abdomen is soft, nondistended, nontender with good bowel sounds.  No appreciable organomegaly.  Extremities no clubbing cyanosis or edema noted.  Neurologic exam is nonfocal.  Mood and affect are flat.    Laboratories:.    Results for orders placed or performed in visit on 11/11/24   HEMOGLOBIN A1C     Status: Abnormal   Result Value Ref Range    Estimated Average Glucose 212 (H) <117 mg/dL    Hemoglobin A1C 9.0 (H) <5.7 %   BASIC METABOLIC PANEL     Status: Abnormal   Result Value Ref Range    Sodium 136 135 - 145 mmol/L    Potassium 4.4 3.4 - 5.3 mmol/L    Chloride 100 98 - 107 mmol/L    Carbon Dioxide (CO2) 25 22 - 29 mmol/L    Anion Gap 11 7 - 15 mmol/L    Urea Nitrogen 17.1 8.0 - 23.0 mg/dL    Creatinine 0.91 0.67 - 1.17 mg/dL    GFR Estimate >90 >60 mL/min/1.73m2    Calcium 9.6 8.8 - 10.4 mg/dL    Glucose 187 (H) 70 - 99 mg/dL   Albumin Random Urine Quantitative with Creat Ratio     Status: Abnormal   Result Value Ref Range    Creatinine Urine mg/dL 117.3 mg/dL    Albumin Urine mg/L 49.3 mg/L    Albumin Urine mg/g Cr 42.03 (H) 0.00 - 17.00 mg/g Cr   CBC with Platelets     Status: Abnormal   Result Value Ref Range    WBC Count 7.5 4.0 - 11.0 10e3/uL    RBC Count 5.01 4.40 - 5.90 10e6/uL    Hemoglobin 15.6 13.3 - 17.7 g/dL    Hematocrit 46.2 40.0 - 53.0 %    MCV 92 78 - 100 fL    MCH 31.1 26.5 - 33.0 pg    MCHC 33.8 31.5 - 36.5 g/dL    RDW 12.7 10.0 - 15.0 %    Platelet Count 126 (L) 150 - 450 10e3/uL    RBC and Platelet Morphology     Status: Abnormal   Result Value Ref Range    RBC Morphology Confirmed RBC Indices     Platelet Assessment  Automated Count Confirmed. Platelet morphology is normal.     Automated Count Confirmed. Platelet morphology is normal.    Polychromasia Slight (A) None Seen     Hemoglobin A1c is uncontrolled at 9.0.  He is very inconsistent in his compliance with his medications.  He is currently on maximum dose metformin.  Will add Jardiance 10 mg once daily with a plan for repeat A1c in 3 months.    Renal function remains stable with a creatinine of 0.91 and GFR of greater than 90.  His random glucose is 187.  Hemoglobin is stable at 15.6 and platelets are slightly low at 126 due to his Eliquis.    Assessment: 63-year-old male who presents to clinic for annual preventive visit and update of chronic medical problems.  He declines further lung cancer screening.  He declines all vaccination.  Relative to his diabetes his A1c is uncontrolled.  We reviewed importance of compliance with the medications and ways that he can take the medications in a more convenient way.  We will need to add a second diabetic medication as I do not believe he will be able to effectively adjust his diet and exercise.  Will add Jardiance 10 mg once daily with a plan to repeat a hemoglobin A1c in 3 months.  Relative to his hypertension it is poorly controlled because he has been noncompliant with his metoprolol.  This is also affecting his rate control for his atrial fibrillation.  Strongly reinforced the idea that he needs to continue to take the metoprolol as prescribed.    Plan: Continue current medications as prescribed.  Will add Jardiance 10 mg once daily with a plan to repeat hemoglobin A1c in 3 months.  He will need a follow-up appointment in clinic in 6 months of asked him to schedule appointment to establish primary care with one of my partners after my nursing home.  He will need an annual wellness visit  again in 1 year.    Type 2 diabetes mellitus without complication, without long-term current use of insulin (H)  Chronic, uncontrolled.  A1c is up to 9.0.  He is currently on metformin 1000 mg twice daily.  He is finding compliance with medications throughout the day difficult.  Will start Jardiance 10 mg once daily.  Repeat hemoglobin A1c in 3 months.  Continue lisinopril 5 mg once daily continue Lipitor 20 mg daily continue Eliquis.  - HEMOGLOBIN A1C; Future  - Albumin Random Urine Quantitative with Creat Ratio; Future  - atorvastatin (LIPITOR) 20 MG tablet; Take 1 tablet (20 mg) by mouth daily.  - lisinopril (ZESTRIL) 5 MG tablet; Take 1 tablet (5 mg) by mouth daily.  - metFORMIN (GLUCOPHAGE XR) 500 MG 24 hr tablet; Take 2 tablets (1,000 mg) by mouth 2 times daily (with meals).  - HEMOGLOBIN A1C  - Albumin Random Urine Quantitative with Creat Ratio    Essential hypertension  Chronic, poorly controlled secondary to medication compliance.  Renal function is stable.  Continue Toprol XL half tablet once daily, continue lisinopril 5 mg once daily.  Recheck blood pressure in 1 month.  - BASIC METABOLIC PANEL; Future  - amLODIPine (NORVASC) 5 MG tablet; Take 1 tablet (5 mg) by mouth daily.  - BASIC METABOLIC PANEL    Atrial fibrillation with rapid ventricular response (H)  Chronic, rate is poorly controlled due to noncompliance with the metoprolol.  He is intolerant of the 100 mg Toprol XL.  We will reduce the dose to 50 mg once daily.  Continue on Eliquis.  - apixaban ANTICOAGULANT (ELIQUIS) 5 MG tablet; Take 1 tablet (5 mg) by mouth 2 times daily.  - metoprolol succinate ER (TOPROL XL) 100 MG 24 hr tablet; Take 0.5 tablets (50 mg) by mouth daily at 2 pm.    Benign prostatic hyperplasia, unspecified whether lower urinary tract symptoms present  Chronic, stable.  Continue current medication and plan.  - tamsulosin (FLOMAX) 0.4 MG capsule; Take 2 capsules (0.8 mg) by mouth daily.    Chronic congestive heart failure,  "unspecified heart failure type (H)  Chronic, stable.  Continue current medication and plan.  - CBC with Platelets; Future  - CBC with Platelets  - RBC and Platelet Morphology    Patient has been advised of split billing requirements and indicates understanding: Yes    Results for orders placed or performed in visit on 11/11/24   HEMOGLOBIN A1C     Status: Abnormal   Result Value Ref Range    Estimated Average Glucose 212 (H) <117 mg/dL    Hemoglobin A1C 9.0 (H) <5.7 %   BASIC METABOLIC PANEL     Status: Abnormal   Result Value Ref Range    Sodium 136 135 - 145 mmol/L    Potassium 4.4 3.4 - 5.3 mmol/L    Chloride 100 98 - 107 mmol/L    Carbon Dioxide (CO2) 25 22 - 29 mmol/L    Anion Gap 11 7 - 15 mmol/L    Urea Nitrogen 17.1 8.0 - 23.0 mg/dL    Creatinine 0.91 0.67 - 1.17 mg/dL    GFR Estimate >90 >60 mL/min/1.73m2    Calcium 9.6 8.8 - 10.4 mg/dL    Glucose 187 (H) 70 - 99 mg/dL   Albumin Random Urine Quantitative with Creat Ratio     Status: Abnormal   Result Value Ref Range    Creatinine Urine mg/dL 117.3 mg/dL    Albumin Urine mg/L 49.3 mg/L    Albumin Urine mg/g Cr 42.03 (H) 0.00 - 17.00 mg/g Cr   CBC with Platelets     Status: Abnormal   Result Value Ref Range    WBC Count 7.5 4.0 - 11.0 10e3/uL    RBC Count 5.01 4.40 - 5.90 10e6/uL    Hemoglobin 15.6 13.3 - 17.7 g/dL    Hematocrit 46.2 40.0 - 53.0 %    MCV 92 78 - 100 fL    MCH 31.1 26.5 - 33.0 pg    MCHC 33.8 31.5 - 36.5 g/dL    RDW 12.7 10.0 - 15.0 %    Platelet Count 126 (L) 150 - 450 10e3/uL   RBC and Platelet Morphology     Status: Abnormal   Result Value Ref Range    RBC Morphology Confirmed RBC Indices     Platelet Assessment  Automated Count Confirmed. Platelet morphology is normal.     Automated Count Confirmed. Platelet morphology is normal.    Polychromasia Slight (A) None Seen         BMI  Estimated body mass index is 32.35 kg/m  as calculated from the following:    Height as of this encounter: 1.819 m (5' 11.6\").    Weight as of this encounter: 107 " kg (235 lb 14.4 oz).   Weight management plan: Discussed healthy diet and exercise guidelines    Counseling  Appropriate preventive services were addressed with this patient via screening, questionnaire, or discussion as appropriate for fall prevention, nutrition, physical activity, Tobacco-use cessation, social engagement, weight loss and cognition.  Checklist reviewing preventive services available has been given to the patient.  Reviewed patient's diet, addressing concerns and/or questions.   The patient was instructed to see the dentist every 6 months.       FUTURE LABS:       - Schedule non-fasting labs in 3 months  FUTURE APPOINTMENTS:       - Follow-up visit in 6 months to establish primary care after my longterm    SELF MONITORING:       - Please check blood glucose readings daily       - Please check blood pressure readings daily  Work on weight loss  Regular exercise   Follow-up Visit   Expected date:  Nov 11, 2025 (Approximate)      Follow Up Appointment Details:     Follow-up with whom?: PCP    Follow-Up for what?: Adult Preventive    How?: In Person                   Jelena Wade is a 63 year old, presenting for the following:  Physical        11/11/2024     4:43 PM   Additional Questions   Roomed by Jai HINES      Diabetes Follow-up    How often are you checking your blood sugar? Not at all  What concerns do you have today about your diabetes? None   Do you have any of these symptoms? (Select all that apply)  Numbness in feet  Have you had a diabetic eye exam in the last 12 months? No            Hyperlipidemia Follow-Up    Are you regularly taking any medication or supplement to lower your cholesterol?   Yes- Lipitor 20  Are you having muscle aches or other side effects that you think could be caused by your cholesterol lowering medication?  No    Hypertension Follow-up    Do you check your blood pressure regularly outside of the clinic? No   Are you following a low salt diet? No  Are  your blood pressures ever more than 140 on the top number (systolic) OR more   than 90 on the bottom number (diastolic), for example 140/90? N/A    BP Readings from Last 2 Encounters:   11/11/24 (!) 140/80   04/15/24 104/68     Hemoglobin A1C (%)   Date Value   11/11/2024 9.0 (H)   03/14/2024 8.2 (H)     LDL Cholesterol Calculated (no units)   Date Value   03/14/2024      Comment:     Cannot estimate LDL when triglyceride exceeds 400 mg/dL     LDL Cholesterol Direct (mg/dL)   Date Value   03/14/2024 118 (H)         Atrial Fibrillation Follow-up    Symptoms: no recent chest pain, significant palpitations, dizziness/lightheadedness, dyspnea, or increased peripheral edema.  Stroke prevention: DOAC (Eliquis, Xarelto, Pradaxa)        4/1/2024     9:10 AM 4/1/2024     9:20 AM 4/1/2024     9:30 AM 4/15/2024    10:49 AM 11/11/2024     4:45 PM   Date   Pulse 91 91 101 100 100     Current [id number] Score: 5 points - A score of 5 or greater represents a 7.2 - 12.2% annual risk of major embolic event, without anti-coagulation or an LAAO device.       Heart Failure Follow-up   Are you experiencing any shortness of breath? No  Are you experiencing any swelling in your legs or feet?  Stable  Are you using more pillows than usual? No  Do you cough at night?  Yes  Do you check your weight daily?  No  Have you had a weight change recently?  No  Are you having any of the following side effects from your medications? (Select all that apply)  The patient does not report symptoms of dizziness, fatigue, cough, swelling, or slow heart beat.  Since your last visit, how many times have you gone to the cardiologist, urgent care, emergency room, or hospital because of your heart failure?   None  Last Echo: No results found.    Health Care Directive  Patient does not have a Health Care Directive: Discussed advance care planning with patient; information given to patient to review.      11/11/2024   General Health   How would you rate your  overall physical health? (!) FAIR   Feel stress (tense, anxious, or unable to sleep) Only a little      (!) STRESS CONCERN      11/11/2024   Nutrition   Three or more servings of calcium each day? (!) I DON'T KNOW   Diet: Diabetic   How many servings of fruit and vegetables per day? (!) 0-1   How many sweetened beverages each day? (!) 2            11/11/2024   Exercise   Days per week of moderate/strenous exercise 5 days   Average minutes spent exercising at this level 20 min            11/11/2024   Social Factors   Frequency of gathering with friends or relatives Once a week   Worry food won't last until get money to buy more No   Food not last or not have enough money for food? No   Do you have housing? (Housing is defined as stable permanent housing and does not include staying ouside in a car, in a tent, in an abandoned building, in an overnight shelter, or couch-surfing.) No   Are you worried about losing your housing? No   Lack of transportation? No   Unable to get utilities (heat,electricity)? No   Want help with housing or utility concern? No      (!) HOUSING CONCERN PRESENT      11/11/2024   Fall Risk   Fallen 2 or more times in the past year? No    Trouble with walking or balance? No        Patient-reported          11/11/2024   Dental   Dentist two times every year? (!) NO            11/11/2024   TB Screening   Were you born outside of the [place]? No            Today's PHQ-2 Score:       11/11/2024     4:36 PM   PHQ-2 ( 1999 [employer])   Q1: Little interest or pleasure in doing things 0    Q2: Feeling down, depressed or hopeless 0    PHQ-2 Score 0    Q1: Little interest or pleasure in doing things Not at all   Q2: Feeling down, depressed or hopeless Not at all   PHQ-2 Score 0       Patient-reported           11/11/2024   Substance Use   Alcohol more than 3/day or more than 7/wk No   Do you use any other substances recreationally? No        Social History     Tobacco Use    Smoking status: Some Days     Types:  "Cigarettes     Passive exposure: Past    Smokeless tobacco: Never   Vaping Use    Vaping status: Some Days           11/11/2024   STI Screening   New sexual partner(s) since last STI/HIV test? No      Last PSA: No results found for: \"PSA\"  ASCVD Risk   The 10-year ASCVD risk score (Emerald IRBY, et al., 2019) is: 42.7%    Values used to calculate the score:      Age: 63 years      Sex: Male      Is Non- : No      Diabetic: Yes      Tobacco smoker: Yes      Systolic Blood Pressure: 140 mmHg      Is BP treated: Yes      HDL Cholesterol: 34 mg/dL      Total Cholesterol: 195 mg/dL           Reviewed and updated as needed this visit by Provider                    Lab work is in process  Labs reviewed in EPIC  BP Readings from Last 3 Encounters:   11/11/24 (!) 140/80   04/15/24 104/68   04/01/24 (!) 135/96    Wt Readings from Last 3 Encounters:   11/11/24 107 kg (235 lb 14.4 oz)   04/15/24 104.9 kg (231 lb 3.2 oz)   04/01/24 104.3 kg (230 lb)                  Patient Active Problem List   Diagnosis    Acute lower limb ischemia    Atrial fibrillation with rapid ventricular response (H)    BPH (benign prostatic hyperplasia)    Essential hypertension    Type 2 diabetes mellitus without complication, without long-term current use of insulin (H)    Pain in joint, shoulder region    CHF (congestive heart failure) (H)     Past Surgical History:   Procedure Laterality Date    ARTHROSCOPY SHOULDER ROTATOR CUFF REPAIR Bilateral 01/05/2011    COLONOSCOPY N/A 4/1/2024    Procedure: Colonoscopy;  Surgeon: Juan José Kilpatrick MD;  Location:  GI    GI SURGERY      HERNIA REPAIR, UMBILICAL  01/05/1979       Social History     Tobacco Use    Smoking status: Some Days     Types: Cigarettes     Passive exposure: Past    Smokeless tobacco: Never   Substance Use Topics    Alcohol use: Not on file     History reviewed. No pertinent family history.      Current Outpatient Medications   Medication Sig Dispense " "Refill    amLODIPine (NORVASC) 5 MG tablet Take 5 mg by mouth daily      apixaban ANTICOAGULANT (ELIQUIS) 5 MG tablet Take 1 tablet (5 mg) by mouth 2 times daily 180 tablet 1    blood glucose (ACCU-CHEK GUIDE) test strip Use to test two times a day.      lisinopril (ZESTRIL) 5 MG tablet Take 1 tablet by mouth daily      metFORMIN (GLUCOPHAGE XR) 500 MG 24 hr tablet Take 1,000 mg by mouth 2 times daily (with meals)      tamsulosin (FLOMAX) 0.4 MG capsule TAKE TWO CAPSULES BY MOUTH DAILY 180 capsule 0    atorvastatin (LIPITOR) 20 MG tablet Take 20 mg by mouth daily (Patient not taking: Reported on 11/11/2024)      gabapentin (NEURONTIN) 100 MG capsule Take 1 capsule (100 mg) by mouth at bedtime (Patient not taking: Reported on 11/11/2024) 90 capsule 1    metoprolol succinate ER (TOPROL XL) 100 MG 24 hr tablet Take 1 tablet by mouth daily at 2 pm (Patient not taking: Reported on 11/11/2024)       Allergies   Allergen Reactions    Penicillins Palpitations and Shortness Of Breath     Recent Labs   Lab Test 03/14/24  1809 02/14/23  1004   A1C 8.2*  --    *  --    HDL 34*  --    TRIG 404* 258*   ALT 28  --    CR 1.00  --    GFRESTIMATED 85  --    POTASSIUM 4.5  --           Review of Systems  CONSTITUTIONAL: NEGATIVE for fever, chills, change in weight  ENT/MOUTH: NEGATIVE for ear, mouth and throat problems  RESP: NEGATIVE for significant cough or SOB  CV: NEGATIVE for chest pain, palpitations or peripheral edema  ROS otherwise negative     Objective    Exam  BP (!) 140/80   Pulse 100   Temp 97.8  F (36.6  C) (Temporal)   Resp 14   Ht 1.819 m (5' 11.6\")   Wt 107 kg (235 lb 14.4 oz)   SpO2 98%   BMI 32.35 kg/m     Estimated body mass index is 32.35 kg/m  as calculated from the following:    Height as of this encounter: 1.819 m (5' 11.6\").    Weight as of this encounter: 107 kg (235 lb 14.4 oz).    Physical Exam  GENERAL: alert, no distress, and over weight  EYES: Eyes grossly normal to inspection, PERRL and " conjunctivae and sclerae normal  HENT: ear canals and TM's normal, nose and mouth without ulcers or lesions  NECK: no adenopathy, no asymmetry, masses, or scars  RESP: lungs clear to auscultation - no rales, rhonchi or wheezes  CV: irregularly irregular rhythm, normal S1 S2, no S3 or S4, no murmur, click or rub, peripheral pulses strong, and no peripheral edema  ABDOMEN: soft, nontender, no hepatosplenomegaly, no masses and bowel sounds normal  MS: no gross musculoskeletal defects noted, no edema  NEURO: Normal strength and tone, mentation intact and speech normal  PSYCH: mentation appears normal, affect normal/bright  LYMPH: no cervical, supraclavicular, axillary, or inguinal adenopathy  Diabetic foot exam: normal DP and PT pulses, no trophic changes or ulcerative lesions, and normal sensory exam        Signed Electronically by: Colt Suarez MD

## 2024-11-11 NOTE — PATIENT INSTRUCTIONS
Patient Education   Preventive Care Advice   This is general advice given by our system to help you stay healthy. However, your care team may have specific advice just for you. Please talk to your care team about your preventive care needs.  Nutrition  Eat 5 or more servings of fruits and vegetables each day.  Try wheat bread, brown rice and whole grain pasta (instead of white bread, rice, and pasta).  Get enough calcium and vitamin D. Check the label on foods and aim for 100% of the RDA (recommended daily allowance).  Lifestyle  Exercise at least 150 minutes each week  (30 minutes a day, 5 days a week).  Do muscle strengthening activities 2 days a week. These help control your weight and prevent disease.  No smoking.  Wear sunscreen to prevent skin cancer.  Have a dental exam and cleaning every 6 months.  Yearly exams  See your health care team every year to talk about:  Any changes in your health.  Any medicines your care team has prescribed.  Preventive care, family planning, and ways to prevent chronic diseases.  Shots (vaccines)   HPV shots (up to age 26), if you've never had them before.  Hepatitis B shots (up to age 59), if you've never had them before.  COVID-19 shot: Get this shot when it's due.  Flu shot: Get a flu shot every year.  Tetanus shot: Get a tetanus shot every 10 years.  Pneumococcal, hepatitis A, and RSV shots: Ask your care team if you need these based on your risk.  Shingles shot (for age 50 and up)  General health tests  Diabetes screening:  Starting at age 35, Get screened for diabetes at least every 3 years.  If you are younger than age 35, ask your care team if you should be screened for diabetes.  Cholesterol test: At age 39, start having a cholesterol test every 5 years, or more often if advised.  Bone density scan (DEXA): At age 50, ask your care team if you should have this scan for osteoporosis (brittle bones).  Hepatitis C: Get tested at least once in your life.  STIs (sexually  transmitted infections)  Before age 24: Ask your care team if you should be screened for STIs.  After age 24: Get screened for STIs if you're at risk. You are at risk for STIs (including HIV) if:  You are sexually active with more than one person.  You don't use condoms every time.  You or a partner was diagnosed with a sexually transmitted infection.  If you are at risk for HIV, ask about PrEP medicine to prevent HIV.  Get tested for HIV at least once in your life, whether you are at risk for HIV or not.  Cancer screening tests  Cervical cancer screening: If you have a cervix, begin getting regular cervical cancer screening tests starting at age 21.  Breast cancer scan (mammogram): If you've ever had breasts, begin having regular mammograms starting at age 40. This is a scan to check for breast cancer.  Colon cancer screening: It is important to start screening for colon cancer at age 45.  Have a colonoscopy test every 10 years (or more often if you're at risk) Or, ask your provider about stool tests like a FIT test every year or Cologuard test every 3 years.  To learn more about your testing options, visit:   .  For help making a decision, visit:   https://bit.ly/se81915.  Prostate cancer screening test: If you have a prostate, ask your care team if a prostate cancer screening test (PSA) at age 55 is right for you.  Lung cancer screening: If you are a current or former smoker ages 50 to 80, ask your care team if ongoing lung cancer screenings are right for you.  For informational purposes only. Not to replace the advice of your health care provider. Copyright   2023 [facility] [employer]. All rights reserved. Clinically reviewed by the [facility] Transitions Program. [employer] [id number] - REV 01/24.     Patient Education   Preventive Care Advice   This is general advice given by our system to help you stay healthy. However, your care team may have specific advice just for you. Please talk to your care team  about your preventive care needs.  Nutrition  Eat 5 or more servings of fruits and vegetables each day.  Try wheat bread, brown rice and whole grain pasta (instead of white bread, rice, and pasta).  Get enough calcium and vitamin D. Check the label on foods and aim for 100% of the RDA (recommended daily allowance).  Lifestyle  Exercise at least 150 minutes each week  (30 minutes a day, 5 days a week).  Do muscle strengthening activities 2 days a week. These help control your weight and prevent disease.  No smoking.  Wear sunscreen to prevent skin cancer.  Have a dental exam and cleaning every 6 months.  Yearly exams  See your health care team every year to talk about:  Any changes in your health.  Any medicines your care team has prescribed.  Preventive care, family planning, and ways to prevent chronic diseases.  Shots (vaccines)   HPV shots (up to age 26), if you've never had them before.  Hepatitis B shots (up to age 59), if you've never had them before.  COVID-19 shot: Get this shot when it's due.  Flu shot: Get a flu shot every year.  Tetanus shot: Get a tetanus shot every 10 years.  Pneumococcal, hepatitis A, and RSV shots: Ask your care team if you need these based on your risk.  Shingles shot (for age 50 and up)  General health tests  Diabetes screening:  Starting at age 35, Get screened for diabetes at least every 3 years.  If you are younger than age 35, ask your care team if you should be screened for diabetes.  Cholesterol test: At age 39, start having a cholesterol test every 5 years, or more often if advised.  Bone density scan (DEXA): At age 50, ask your care team if you should have this scan for osteoporosis (brittle bones).  Hepatitis C: Get tested at least once in your life.  STIs (sexually transmitted infections)  Before age 24: Ask your care team if you should be screened for STIs.  After age 24: Get screened for STIs if you're at risk. You are at risk for STIs (including HIV) if:  You are  "sexually active with more than one person.  You don't use condoms every time.  You or a partner was diagnosed with a sexually transmitted infection.  If you are at risk for HIV, ask about PrEP medicine to prevent HIV.  Get tested for HIV at least once in your life, whether you are at risk for HIV or not.  Cancer screening tests  Cervical cancer screening: If you have a cervix, begin getting regular cervical cancer screening tests starting at age 21.  Breast cancer scan (mammogram): If you've ever had breasts, begin having regular mammograms starting at age 40. This is a scan to check for breast cancer.  Colon cancer screening: It is important to start screening for colon cancer at age 45.  Have a colonoscopy test every 10 years (or more often if you're at risk) Or, ask your provider about stool tests like a FIT test every year or Cologuard test every 3 years.  To learn more about your testing options, visit:   .  For help making a decision, visit:   https://bit.ly/ey68491.  Prostate cancer screening test: If you have a prostate, ask your care team if a prostate cancer screening test (PSA) at age 55 is right for you.  Lung cancer screening: If you are a current or former smoker ages 50 to 80, ask your care team if ongoing lung cancer screenings are right for you.  For informational purposes only. Not to replace the advice of your health care provider. Copyright   2023 [facility]. All rights reserved. Clinically reviewed by the [facility] Transitions Program. [employer] [id number] - REV 01/24.  Learning About Being Physically Active  What is physical activity?     Being physically active means doing any kind of activity that gets your body moving.  The types of physical activity that can help you get fit and stay healthy include:  Aerobic or \"cardio\" activities. These make your heart beat faster and make you breathe harder, such as brisk walking, riding a bike, or running. They strengthen your heart " "and lungs and build up your endurance.  Strength training activities. These make your muscles work against, or \"resist,\" something. Examples include lifting weights or doing push-ups. These activities help tone and strengthen your muscles and bones.  Stretches. These let you move your joints and muscles through their full range of motion. Stretching helps you be more flexible.  Reaching a balance between these three types of physical activity is important because each one contributes to your overall fitness.  What are the benefits of being active?  Being active is one of the best things you can do for your health. It helps you to:  Feel stronger and have more energy to do all the things you like to do.  Focus better at school or work.  Feel, think, and sleep better.  Reach and stay at a healthy weight.  Lose fat and build lean muscle.  Lower your risk for serious health problems, including diabetes, heart attack, high blood pressure, and some cancers.  Keep your heart, lungs, bones, muscles, and joints strong and healthy.  How can you make being active part of your life?  Start slowly. Make it your long-term goal to get at least 30 minutes of exercise on most days of the week. Walking is a good choice. You also may want to do other activities, such as running, swimming, cycling, or playing tennis or team sports.  Pick activities that you like--ones that make your heart beat faster, your muscles stronger, and your muscles and joints more flexible. If you find more than one thing you like doing, do them all. You don't have to do the same thing every day.  Get your heart pumping every day. Any activity that makes your heart beat faster and keeps it at that rate for a while counts.  Here are some great ways to get your heart beating faster:  Go for a brisk walk, run, or hike.  Go for a swim or bike ride.  Take an online exercise class or dance.  Play a game of touch football, basketball, or soccer.  Play tennis, " "pickleball, or racquetball.  Climb stairs.  Even some household chores can be aerobic. Just do them at a faster pace. Raking or mowing the lawn, sweeping the garage, and vacuuming and cleaning your home all can help get your heart rate up.  Strengthen your muscles during the week. You don't have to lift heavy weights or grow big, bulky muscles to get stronger. Doing a few simple activities that make your muscles work against, or \"resist,\" something can help you get stronger. Aim for at least twice a week.  For example, you can:  Do push-ups or sit-ups, which use your own body weight as resistance.  Lift weights or dumbbells or use stretch bands at home or in a gym or community center.  Stretch your muscles often. Stretching will help you as you become more active. It can help you stay flexible and loosen tight muscles. It can also help improve your balance and posture and can be a great way to relax.  Be sure to stretch the muscles you'll be using when you work out. It's best to warm your muscles slightly before you stretch them. Walk or do some other light aerobic activity for a few minutes. Then start stretching.  When you stretch your muscles:  Do it slowly. Stretching is not about going fast or making sudden movements.  Don't push or bounce during a stretch.  Hold each stretch for at least 15 to 30 seconds, if you can. You should feel a stretch in the muscle, but not pain.  Breathe out as you do the stretch. Then breathe in as you hold the stretch. Don't hold your breath.  If you're worried about how more activity might affect your health, have a checkup before you start. Follow any special advice your doctor gives you for getting a smart start.  Where can you learn more?  Go to https://www.[employer].net/patiented  Enter W332 in the search box to learn more about \"Learning About Being Physically Active.\"  Current as of: June 5, 2023  Content Version: 14.2 2024 [facility] [employer].   Care instructions " "adapted under license by your healthcare professional. If you have questions about a medical condition or this instruction, always ask your healthcare professional. Healthwise, [facility] disclaims any warranty or liability for your use of this information.    Eating Healthy Foods: Care Instructions  With every meal, you can make healthy food choices. Try to eat a variety of fruits, vegetables, whole grains, lean proteins, and low-fat dairy products. This can help you get the right balance of nutrients, including vitamins and minerals. Small changes add up over time. You can start by adding one healthy food to your meals each day.    Try to make half your plate fruits and vegetables, one-fourth whole grains, and one-fourth lean proteins. Try including dairy with your meals.   Eat more fruits and vegetables. Try to have them with most meals and snacks.   Foods for healthy eating        Fruits   These can be fresh, frozen, canned, or dried.  Try to choose whole fruit rather than fruit juice.  Eat a variety of colors.        Vegetables   These can be fresh, frozen, canned, or dried.  Beans, peas, and lentils count too.        Whole grains   Choose whole-grain breads, cereals, and noodles.  Try brown rice.        Lean proteins   These can include lean meat, poultry, fish, and eggs.  You can also have tofu, beans, peas, lentils, nuts, and seeds.        Dairy   Try milk, yogurt, and cheese.  Choose low-fat or fat-free when you can.  If you need to, use lactose-free milk or fortified plant-based milk products, such as soy milk.        Water   Drink water when you're thirsty.  Limit sugar-sweetened drinks, including soda, fruit drinks, and sports drinks.  Where can you learn more?  Go to https://www.[employer].net/patiented  Enter T756 in the search box to learn more about \"Eating Healthy Foods: Care Instructions.\"  Current as of: September 20, 2023  Content Version: 14.2 2024 [facility].   Care " instructions adapted under license by your healthcare professional. If you have questions about a medical condition or this instruction, always ask your healthcare professional. Healthwise[employer] disclaims any warranty or liability for your use of this information.

## 2024-11-11 NOTE — LETTER
November 14, 2024      Mauro Horn  [id number] [address] [facility] [address]        Dear ,    We are writing to inform you of your test results.    Your test results fall within the expected range(s) or remain unchanged from previous results.  Please continue with your current treatment plan. Test results indicate you may require additional follow up, your hemoglobin A1c the measure for your diabetes control is elevated at 9.0.  I would like to add a second medication to help control your blood sugars.  I would like to add Jardiance 10 mg once daily.  I sent a new prescription to your pharmacy for this medication.  Take it once daily in the morning with food.  You should have your hemoglobin A1c rechecked again in 3 months.  Your renal function remains stable and your hemoglobin is stable as well.  Please plan to follow-up for recheck of your blood pressure with one of the nurses in the clinic in 1 month.     Resulted Orders   HEMOGLOBIN A1C   Result Value Ref Range    Estimated Average Glucose 212 (H) <117 mg/dL    Hemoglobin A1C 9.0 (H) <5.7 %      Comment:      Normal <5.7%   Prediabetes 5.7-6.4%    Diabetes 6.5% or higher     Note: Adopted from ADA consensus guidelines.   BASIC METABOLIC PANEL   Result Value Ref Range    Sodium 136 135 - 145 mmol/L    Potassium 4.4 3.4 - 5.3 mmol/L    Chloride 100 98 - 107 mmol/L    Carbon Dioxide (CO2) 25 22 - 29 mmol/L    Anion Gap 11 7 - 15 mmol/L    Urea Nitrogen 17.1 8.0 - 23.0 mg/dL    Creatinine 0.91 0.67 - 1.17 mg/dL    GFR Estimate >90 >60 mL/min/1.73m2      Comment:      eGFR calculated using 2021 CKD-EPI equation.    Calcium 9.6 8.8 - 10.4 mg/dL      Comment:      Reference intervals for this test were updated on 7/16/2024 to reflect our healthy population more accurately. There may be differences in the flagging of prior results with similar values performed with this method. Those prior results can be interpreted in the context of the updated reference  intervals.    Glucose 187 (H) 70 - 99 mg/dL   Albumin Random Urine Quantitative with Creat Ratio   Result Value Ref Range    Creatinine Urine mg/dL 117.3 mg/dL      Comment:      The reference ranges have not been established in urine creatinine. The results should be integrated into the clinical context for interpretation.    Albumin Urine mg/L 49.3 mg/L      Comment:      The reference ranges have not been established in urine albumin. The results should be integrated into the clinical context for interpretation.    Albumin Urine mg/g Cr 42.03 (H) 0.00 - 17.00 mg/g Cr      Comment:      Microalbuminuria is defined as an albumin:creatinine ratio of 17 to 299 for males and 25 to 299 for females. A ratio of albumin:creatinine of 300 or higher is indicative of overt proteinuria.  Due to biologic variability, positive results should be confirmed by a second, first-morning random or 24-hour timed urine specimen. If there is discrepancy, a third specimen is recommended. When 2 out of 3 results are in the microalbuminuria range, this is evidence for incipient nephropathy and warrants increased efforts at glucose control, blood pressure control, and institution of therapy with an angiotensin-converting-enzyme (ACE) inhibitor (if the patient can tolerate it).     CBC with Platelets   Result Value Ref Range    WBC Count 7.5 4.0 - 11.0 10e3/uL    RBC Count 5.01 4.40 - 5.90 10e6/uL    Hemoglobin 15.6 13.3 - 17.7 g/dL    Hematocrit 46.2 40.0 - 53.0 %    MCV 92 78 - 100 fL    MCH 31.1 26.5 - 33.0 pg    MCHC 33.8 31.5 - 36.5 g/dL    RDW 12.7 10.0 - 15.0 %    Platelet Count 126 (L) 150 - 450 10e3/uL   RBC and Platelet Morphology   Result Value Ref Range    RBC Morphology Confirmed RBC Indices     Platelet Assessment  Automated Count Confirmed. Platelet morphology is normal.     Automated Count Confirmed. Platelet morphology is normal.    Polychromasia Slight (A) None Seen   If you have any questions or concerns, please call the  clinic at the number listed above.       Sincerely,      Colt Suarez MD

## (undated) DEVICE — TUBING SUCTION 6"X3/16" N56A

## (undated) DEVICE — SOL WATER IRRIG 1000ML BOTTLE 2F7114

## (undated) DEVICE — KIT ENDO TURNOVER/PROCEDURE CARRY-ON 101822